# Patient Record
Sex: MALE | Race: WHITE | NOT HISPANIC OR LATINO | Employment: FULL TIME | URBAN - METROPOLITAN AREA
[De-identification: names, ages, dates, MRNs, and addresses within clinical notes are randomized per-mention and may not be internally consistent; named-entity substitution may affect disease eponyms.]

---

## 2018-02-28 ENCOUNTER — OFFICE VISIT (OUTPATIENT)
Dept: FAMILY MEDICINE CLINIC | Facility: CLINIC | Age: 58
End: 2018-02-28
Payer: COMMERCIAL

## 2018-02-28 VITALS
OXYGEN SATURATION: 98 % | HEART RATE: 82 BPM | DIASTOLIC BLOOD PRESSURE: 88 MMHG | TEMPERATURE: 97.2 F | WEIGHT: 189 LBS | HEIGHT: 69 IN | RESPIRATION RATE: 16 BRPM | BODY MASS INDEX: 27.99 KG/M2 | SYSTOLIC BLOOD PRESSURE: 130 MMHG

## 2018-02-28 DIAGNOSIS — Z12.11 SCREEN FOR COLON CANCER: ICD-10-CM

## 2018-02-28 DIAGNOSIS — R19.7 DIARRHEA, UNSPECIFIED TYPE: ICD-10-CM

## 2018-02-28 DIAGNOSIS — Z12.5 SCREENING FOR PROSTATE CANCER: Primary | ICD-10-CM

## 2018-02-28 DIAGNOSIS — R10.9 ABDOMINAL PAIN, UNSPECIFIED ABDOMINAL LOCATION: ICD-10-CM

## 2018-02-28 PROCEDURE — 99203 OFFICE O/P NEW LOW 30 MIN: CPT | Performed by: INTERNAL MEDICINE

## 2018-02-28 RX ORDER — DIPHENOXYLATE HYDROCHLORIDE AND ATROPINE SULFATE 2.5; .025 MG/1; MG/1
1 TABLET ORAL DAILY
COMMUNITY

## 2018-02-28 NOTE — PROGRESS NOTES
Assessment/Plan:         Diagnoses and all orders for this visit:    Screening for prostate cancer  -     PSA, total and free; Future    Screen for colon cancer  -     Ambulatory referral to Colorectal Surgery; Future    Diarrhea, unspecified type  Comments:  he will collect the stool studies if diarrhea persits beyond 2 days  Orders:  -     CBC; Future  -     Comprehensive metabolic panel; Future  -     Lipid panel; Future  -     Cancel: TSH+Free T4  -     Sedimentation rate, automated; Future  -     Stool Enteric Bacterial Panel by PCR; Future  -     FECAL LEUKOCYTES; Future  -     Ova and parasite examination; Future  -     C difficile Toxins A+B, EIA; Future    Abdominal pain, unspecified abdominal location  -     Amylase; Future    Other orders  -     multivitamin (THERAGRAN) TABS; Take 1 tablet by mouth daily          Subjective:      Patient ID: Jacinta Livingston is a 65 y o  male  Pt has pain around his bladder  +diarrhea x 2 days  He does not have today  +fatigued  Denies f/s/c  Denies brb, melena, +some nausea - vomit   +constant pain  +pain worse with twisting or pushing on it   +diarrhea x 2 a day, ?tenesmus  Diarrhea - travel + well water, denies antibiotic within the month   +around grandkids with diarrhea  Reviewed hx/soc  hx        The following portions of the patient's history were reviewed and updated as appropriate: He  has no past medical history on file  He There are no active problems to display for this patient  He  has a past surgical history that includes Hernia repair  His family history includes Cancer in his mother; Hyperlipidemia in his father; Hypertension in his father  He  reports that he has been smoking  He has a 30 00 pack-year smoking history  He has never used smokeless tobacco  He reports that he does not drink alcohol or use drugs    Current Outpatient Prescriptions   Medication Sig Dispense Refill    multivitamin (THERAGRAN) TABS Take 1 tablet by mouth daily No current facility-administered medications for this visit  No current outpatient prescriptions on file prior to visit  No current facility-administered medications on file prior to visit  He has No Known Allergies       Review of Systems   Constitutional: Negative for chills and fever  HENT: Negative  Respiratory: Negative  Cardiovascular: Negative  Gastrointestinal: Positive for abdominal pain, diarrhea and nausea  Negative for anal bleeding, blood in stool, constipation and vomiting  Objective:      /88 (BP Location: Left arm, Patient Position: Sitting, Cuff Size: Large)   Pulse 82   Temp (!) 97 2 °F (36 2 °C) (Tympanic)   Resp 16   Ht 5' 9" (1 753 m)   Wt 85 7 kg (189 lb)   SpO2 98%   BMI 27 91 kg/m²          Physical Exam   Constitutional: He appears well-developed and well-nourished  HENT:   Head: Normocephalic and atraumatic  Neck: Normal range of motion  Neck supple  Cardiovascular: Normal rate, regular rhythm and normal heart sounds  Pulmonary/Chest: Effort normal and breath sounds normal    Abdominal: Soft   Bowel sounds are normal    Genitourinary: Rectum normal and prostate normal

## 2018-03-03 ENCOUNTER — APPOINTMENT (OUTPATIENT)
Dept: LAB | Facility: HOSPITAL | Age: 58
End: 2018-03-03
Payer: COMMERCIAL

## 2018-03-03 DIAGNOSIS — Z12.5 SCREENING FOR PROSTATE CANCER: ICD-10-CM

## 2018-03-03 DIAGNOSIS — R10.9 ABDOMINAL PAIN, UNSPECIFIED ABDOMINAL LOCATION: ICD-10-CM

## 2018-03-03 DIAGNOSIS — R19.7 DIARRHEA, UNSPECIFIED TYPE: Primary | ICD-10-CM

## 2018-03-03 LAB
ALBUMIN SERPL BCP-MCNC: 3.9 G/DL (ref 3.5–5)
ALP SERPL-CCNC: 74 U/L (ref 46–116)
ALT SERPL W P-5'-P-CCNC: 29 U/L (ref 12–78)
AMYLASE SERPL-CCNC: 54 IU/L (ref 25–115)
ANION GAP SERPL CALCULATED.3IONS-SCNC: 4 MMOL/L (ref 4–13)
AST SERPL W P-5'-P-CCNC: 13 U/L (ref 5–45)
BILIRUB SERPL-MCNC: 0.5 MG/DL (ref 0.2–1)
BUN SERPL-MCNC: 21 MG/DL (ref 5–25)
CALCIUM SERPL-MCNC: 10 MG/DL (ref 8.3–10.1)
CHLORIDE SERPL-SCNC: 107 MMOL/L (ref 100–108)
CHOLEST SERPL-MCNC: 243 MG/DL (ref 50–200)
CO2 SERPL-SCNC: 32 MMOL/L (ref 21–32)
CREAT SERPL-MCNC: 1.19 MG/DL (ref 0.6–1.3)
ERYTHROCYTE [DISTWIDTH] IN BLOOD BY AUTOMATED COUNT: 12.8 % (ref 11.6–15.1)
ERYTHROCYTE [SEDIMENTATION RATE] IN BLOOD: 12 MM/HOUR (ref 0–10)
GFR SERPL CREATININE-BSD FRML MDRD: 68 ML/MIN/1.73SQ M
GLUCOSE P FAST SERPL-MCNC: 105 MG/DL (ref 65–99)
HCT VFR BLD AUTO: 51.4 % (ref 36.5–49.3)
HDLC SERPL-MCNC: 45 MG/DL (ref 40–60)
HGB BLD-MCNC: 17.4 G/DL (ref 12–17)
LDLC SERPL CALC-MCNC: 179 MG/DL (ref 0–100)
MCH RBC QN AUTO: 29.6 PG (ref 26.8–34.3)
MCHC RBC AUTO-ENTMCNC: 33.9 G/DL (ref 31.4–37.4)
MCV RBC AUTO: 87 FL (ref 82–98)
PLATELET # BLD AUTO: 215 THOUSANDS/UL (ref 149–390)
PMV BLD AUTO: 12.3 FL (ref 8.9–12.7)
POTASSIUM SERPL-SCNC: 4.8 MMOL/L (ref 3.5–5.3)
PROT SERPL-MCNC: 8.1 G/DL (ref 6.4–8.2)
RBC # BLD AUTO: 5.88 MILLION/UL (ref 3.88–5.62)
SODIUM SERPL-SCNC: 143 MMOL/L (ref 136–145)
T4 FREE SERPL-MCNC: 1.08 NG/DL (ref 0.76–1.46)
TRIGL SERPL-MCNC: 93 MG/DL
TSH SERPL DL<=0.05 MIU/L-ACNC: 1.39 UIU/ML (ref 0.36–3.74)
WBC # BLD AUTO: 6.44 THOUSAND/UL (ref 4.31–10.16)

## 2018-03-03 PROCEDURE — 85652 RBC SED RATE AUTOMATED: CPT

## 2018-03-03 PROCEDURE — 84439 ASSAY OF FREE THYROXINE: CPT

## 2018-03-03 PROCEDURE — 84154 ASSAY OF PSA FREE: CPT

## 2018-03-03 PROCEDURE — 84153 ASSAY OF PSA TOTAL: CPT

## 2018-03-03 PROCEDURE — 80061 LIPID PANEL: CPT

## 2018-03-03 PROCEDURE — 80053 COMPREHEN METABOLIC PANEL: CPT

## 2018-03-03 PROCEDURE — 82150 ASSAY OF AMYLASE: CPT

## 2018-03-03 PROCEDURE — 85027 COMPLETE CBC AUTOMATED: CPT

## 2018-03-03 PROCEDURE — 36415 COLL VENOUS BLD VENIPUNCTURE: CPT

## 2018-03-03 PROCEDURE — 84443 ASSAY THYROID STIM HORMONE: CPT

## 2018-03-05 ENCOUNTER — TELEPHONE (OUTPATIENT)
Dept: OTHER | Facility: HOSPITAL | Age: 58
End: 2018-03-05

## 2018-03-05 NOTE — TELEPHONE ENCOUNTER
Told pt lab okay except elevated cholesterol  Can try diet/red yeast rice or start statin  To let me know what he wants  Also await his psa

## 2018-03-06 ENCOUNTER — APPOINTMENT (OUTPATIENT)
Dept: LAB | Facility: HOSPITAL | Age: 58
End: 2018-03-06
Payer: COMMERCIAL

## 2018-03-06 DIAGNOSIS — R19.7 DIARRHEA, UNSPECIFIED TYPE: ICD-10-CM

## 2018-03-06 LAB
PSA FREE MFR SERPL: 22.7 %
PSA FREE SERPL-MCNC: 0.75 NG/ML
PSA SERPL-MCNC: 3.3 NG/ML (ref 0–4)

## 2018-03-06 PROCEDURE — 89055 LEUKOCYTE ASSESSMENT FECAL: CPT

## 2018-03-06 PROCEDURE — 87505 NFCT AGENT DETECTION GI: CPT

## 2018-03-06 PROCEDURE — 87493 C DIFF AMPLIFIED PROBE: CPT

## 2018-03-06 PROCEDURE — 87177 OVA AND PARASITES SMEARS: CPT

## 2018-03-06 PROCEDURE — 87209 SMEAR COMPLEX STAIN: CPT

## 2018-03-07 LAB
C DIFF TOX GENS STL QL NAA+PROBE: ABNORMAL
CAMPYLOBACTER DNA SPEC NAA+PROBE: NORMAL
SALMONELLA DNA SPEC QL NAA+PROBE: NORMAL
SHIGA TOXIN STX GENE SPEC NAA+PROBE: NORMAL
SHIGELLA DNA SPEC QL NAA+PROBE: NORMAL

## 2018-03-08 ENCOUNTER — TELEPHONE (OUTPATIENT)
Dept: OTHER | Facility: HOSPITAL | Age: 58
End: 2018-03-08

## 2018-03-08 DIAGNOSIS — R97.20 ELEVATED PSA: Primary | ICD-10-CM

## 2018-03-09 LAB
O+P STL CONC: NORMAL
WBC SPEC QL GRAM STN: NORMAL

## 2018-04-11 ENCOUNTER — OFFICE VISIT (OUTPATIENT)
Dept: FAMILY MEDICINE CLINIC | Facility: CLINIC | Age: 58
End: 2018-04-11
Payer: COMMERCIAL

## 2018-04-11 VITALS
TEMPERATURE: 97.5 F | WEIGHT: 188 LBS | OXYGEN SATURATION: 98 % | HEIGHT: 69 IN | RESPIRATION RATE: 16 BRPM | HEART RATE: 85 BPM | DIASTOLIC BLOOD PRESSURE: 80 MMHG | BODY MASS INDEX: 27.85 KG/M2 | SYSTOLIC BLOOD PRESSURE: 130 MMHG

## 2018-04-11 DIAGNOSIS — E78.5 HYPERLIPIDEMIA, UNSPECIFIED HYPERLIPIDEMIA TYPE: Primary | ICD-10-CM

## 2018-04-11 DIAGNOSIS — L72.3 SEBACEOUS CYST: ICD-10-CM

## 2018-04-11 PROCEDURE — 99214 OFFICE O/P EST MOD 30 MIN: CPT | Performed by: PHYSICIAN ASSISTANT

## 2018-04-11 NOTE — PROGRESS NOTES
Assessment/Plan:         Diagnoses and all orders for this visit:    Hyperlipidemia, unspecified hyperlipidemia type  Comments:    Discussed low-fat diet exercise and weight loss  Recheck lipids in 6 months    Sebaceous cyst  Comments:   sebaceous cysts right arm observed  Subjective:      Patient ID: Bozena Weston is a 63 y o  male  Patient presents for follow-up  Patient was here with some abdominal pain he had lots of labs and stool cultures done labs and cultures reviewed  Patient had a positive C diff  He was treated with antibiotics orally  Bowels have since returned to normal   Patient is eating yogurt  Labs reviewed show total cholesterol 243 and an LDL of 179  Fasting blood sugar was 105  Patient also states his blood pressure was a little elevated at last visit and he was told to come in and have his blood pressure checked  Patient would also like to have lump in his right axilla checked  Patient states he founded a day or so ago is not painful and no change in size and he  just found it  The following portions of the patient's history were reviewed and updated as appropriate:   He  has no past medical history on file  He   Patient Active Problem List    Diagnosis Date Noted    Hyperlipidemia 04/11/2018    Sebaceous cyst 04/11/2018     His family history includes Cancer in his mother; Hyperlipidemia in his father; Hypertension in his father  Current Outpatient Prescriptions   Medication Sig Dispense Refill    multivitamin (THERAGRAN) TABS Take 1 tablet by mouth daily       No current facility-administered medications for this visit  Current Outpatient Prescriptions on File Prior to Visit   Medication Sig    multivitamin (THERAGRAN) TABS Take 1 tablet by mouth daily     No current facility-administered medications on file prior to visit  He has No Known Allergies       Review of Systems   Respiratory: Negative for cough and shortness of breath      Cardiovascular: Negative for chest pain and leg swelling  Gastrointestinal: Negative for abdominal pain  Musculoskeletal: Negative for arthralgias  Skin: Negative for rash  Neurological: Negative for dizziness and headaches  Objective:      /80   Pulse 85   Temp 97 5 °F (36 4 °C) (Tympanic)   Resp 16   Ht 5' 9" (1 753 m)   Wt 85 3 kg (188 lb)   SpO2 98%   BMI 27 76 kg/m²          Physical Exam   Constitutional: He is oriented to person, place, and time  He appears well-developed and well-nourished  HENT:   Head: Normocephalic  Right Ear: External ear normal    Left Ear: External ear normal    Mouth/Throat: Oropharynx is clear and moist    Eyes: Conjunctivae are normal  Pupils are equal, round, and reactive to light  Neck: No thyromegaly present  Cardiovascular: Normal rate and regular rhythm  No murmur heard  Pulmonary/Chest: Effort normal and breath sounds normal    Musculoskeletal: He exhibits no edema  Lymphadenopathy:     He has no cervical adenopathy  Neurological: He is alert and oriented to person, place, and time  Skin: Skin is warm and dry  Patient has no clavicular cervical or axillary nodes  Patient has a small sebaceous cyst right upper inner arm  Observe   Psychiatric: He has a normal mood and affect

## 2018-09-26 ENCOUNTER — OFFICE VISIT (OUTPATIENT)
Dept: FAMILY MEDICINE CLINIC | Facility: CLINIC | Age: 58
End: 2018-09-26
Payer: COMMERCIAL

## 2018-09-26 VITALS
HEIGHT: 69 IN | BODY MASS INDEX: 28.11 KG/M2 | RESPIRATION RATE: 16 BRPM | OXYGEN SATURATION: 96 % | HEART RATE: 72 BPM | TEMPERATURE: 97.9 F | DIASTOLIC BLOOD PRESSURE: 76 MMHG | WEIGHT: 189.8 LBS | SYSTOLIC BLOOD PRESSURE: 118 MMHG

## 2018-09-26 DIAGNOSIS — J06.9 UPPER RESPIRATORY TRACT INFECTION, UNSPECIFIED TYPE: Primary | ICD-10-CM

## 2018-09-26 PROCEDURE — 99213 OFFICE O/P EST LOW 20 MIN: CPT | Performed by: INTERNAL MEDICINE

## 2018-09-26 PROCEDURE — 3008F BODY MASS INDEX DOCD: CPT | Performed by: INTERNAL MEDICINE

## 2018-09-26 RX ORDER — AMOXICILLIN 500 MG/1
500 CAPSULE ORAL EVERY 8 HOURS SCHEDULED
Qty: 30 CAPSULE | Refills: 0 | Status: SHIPPED | OUTPATIENT
Start: 2018-09-26 | End: 2018-10-06

## 2018-09-26 NOTE — PROGRESS NOTES
Assessment/Plan:         Diagnoses and all orders for this visit:    Upper respiratory tract infection, unspecified type  Comments:  rx amox    Orders:  -     amoxicillin (AMOXIL) 500 mg capsule; Take 1 capsule (500 mg total) by mouth every 8 (eight) hours for 10 days          Subjective:      Patient ID: Kerrie Howell is a 63 y o  male     +cough, chest pain with cough  Ill x 1 week   +chills +post nasal drip +sore throat +head congest +gurgle sound with deep breath        The following portions of the patient's history were reviewed and updated as appropriate:   He  has no past medical history on file  He   Patient Active Problem List    Diagnosis Date Noted    Hyperlipidemia 04/11/2018    Sebaceous cyst 04/11/2018     He  has a past surgical history that includes Hernia repair  His family history includes Cancer in his mother; Hyperlipidemia in his father; Hypertension in his father  He  reports that he has been smoking  He has a 30 00 pack-year smoking history  He has never used smokeless tobacco  He reports that he drinks alcohol  He reports that he does not use drugs  Current Outpatient Prescriptions   Medication Sig Dispense Refill    multivitamin (THERAGRAN) TABS Take 1 tablet by mouth daily      amoxicillin (AMOXIL) 500 mg capsule Take 1 capsule (500 mg total) by mouth every 8 (eight) hours for 10 days 30 capsule 0     No current facility-administered medications for this visit  Current Outpatient Prescriptions on File Prior to Visit   Medication Sig    multivitamin (THERAGRAN) TABS Take 1 tablet by mouth daily     No current facility-administered medications on file prior to visit  He has No Known Allergies       Review of Systems   Constitutional: Negative  HENT: Positive for congestion, postnasal drip and sore throat  Negative for voice change  Respiratory: Positive for cough  Cardiovascular: Positive for chest pain          Chest pain with cough         Objective:      BP 118/76 (BP Location: Right arm, Patient Position: Sitting, Cuff Size: Standard)   Pulse 72   Temp 97 9 °F (36 6 °C)   Resp 16   Ht 5' 9" (1 753 m)   Wt 86 1 kg (189 lb 12 8 oz)   SpO2 96%   BMI 28 03 kg/m²          Physical Exam   Constitutional: He appears well-developed and well-nourished  HENT:   Head: Normocephalic and atraumatic     Right Ear: External ear normal    Left Ear: External ear normal    Nose: Nose normal    Mouth/Throat: Oropharynx is clear and moist

## 2018-12-03 ENCOUNTER — OFFICE VISIT (OUTPATIENT)
Dept: FAMILY MEDICINE CLINIC | Facility: CLINIC | Age: 58
End: 2018-12-03
Payer: COMMERCIAL

## 2018-12-03 VITALS
OXYGEN SATURATION: 96 % | HEIGHT: 69 IN | BODY MASS INDEX: 28.44 KG/M2 | WEIGHT: 192 LBS | DIASTOLIC BLOOD PRESSURE: 74 MMHG | RESPIRATION RATE: 16 BRPM | TEMPERATURE: 97.9 F | SYSTOLIC BLOOD PRESSURE: 130 MMHG | HEART RATE: 88 BPM

## 2018-12-03 DIAGNOSIS — J32.9 SINUSITIS, UNSPECIFIED CHRONICITY, UNSPECIFIED LOCATION: ICD-10-CM

## 2018-12-03 DIAGNOSIS — Z12.11 SCREEN FOR COLON CANCER: Primary | ICD-10-CM

## 2018-12-03 DIAGNOSIS — J40 BRONCHITIS: ICD-10-CM

## 2018-12-03 PROCEDURE — 99213 OFFICE O/P EST LOW 20 MIN: CPT | Performed by: INTERNAL MEDICINE

## 2018-12-03 PROCEDURE — 3008F BODY MASS INDEX DOCD: CPT | Performed by: INTERNAL MEDICINE

## 2018-12-03 RX ORDER — LEVOFLOXACIN 500 MG/1
500 TABLET, FILM COATED ORAL EVERY 24 HOURS
Qty: 10 TABLET | Refills: 0 | Status: SHIPPED | OUTPATIENT
Start: 2018-12-03 | End: 2018-12-13

## 2018-12-03 NOTE — PROGRESS NOTES
Assessment/Plan:         Diagnoses and all orders for this visit:    Screen for colon cancer  -     Occult Blood, Fecal Immunochemical; Future    Sinusitis, unspecified chronicity, unspecified location  -     levofloxacin (LEVAQUIN) 500 mg tablet; Take 1 tablet (500 mg total) by mouth every 24 hours for 10 days    Bronchitis  -     levofloxacin (LEVAQUIN) 500 mg tablet; Take 1 tablet (500 mg total) by mouth every 24 hours for 10 days          Subjective:      Patient ID: Petra Little is a 63 y o  male  +head stuffy   +around sick grandchildren  +sore throat denies f/s/c   +prod cough  Ill since thursday        The following portions of the patient's history were reviewed and updated as appropriate:   He  has no past medical history on file  He   Patient Active Problem List    Diagnosis Date Noted    Hyperlipidemia 04/11/2018    Sebaceous cyst 04/11/2018     He  has a past surgical history that includes Hernia repair  His family history includes Cancer in his mother; Hyperlipidemia in his father; Hypertension in his father  He  reports that he has been smoking  He has a 30 00 pack-year smoking history  He has never used smokeless tobacco  He reports that he drinks alcohol  He reports that he does not use drugs  Current Outpatient Prescriptions   Medication Sig Dispense Refill    multivitamin (THERAGRAN) TABS Take 1 tablet by mouth daily      levofloxacin (LEVAQUIN) 500 mg tablet Take 1 tablet (500 mg total) by mouth every 24 hours for 10 days 10 tablet 0     No current facility-administered medications for this visit  Current Outpatient Prescriptions on File Prior to Visit   Medication Sig    multivitamin (THERAGRAN) TABS Take 1 tablet by mouth daily     No current facility-administered medications on file prior to visit  He has No Known Allergies       Review of Systems   Constitutional: Negative for fever     HENT: Positive for congestion, postnasal drip, sinus pressure, sore throat and voice change  Respiratory: Positive for cough  Cardiovascular: Negative  Neurological: Negative for headaches  Objective:      /74 (BP Location: Right arm, Patient Position: Sitting, Cuff Size: Large)   Pulse 88   Temp 97 9 °F (36 6 °C) (Tympanic)   Resp 16   Ht 5' 9" (1 753 m)   Wt 87 1 kg (192 lb)   SpO2 96%   BMI 28 35 kg/m²          Physical Exam   Constitutional: He appears well-developed and well-nourished  HENT:   Head: Normocephalic and atraumatic  Right Ear: External ear normal    Left Ear: External ear normal    Nose: Nose normal    Mouth/Throat: Oropharynx is clear and moist    Neck: Normal range of motion  Neck supple  Cardiovascular: Normal rate, regular rhythm and normal heart sounds      Pulmonary/Chest: Effort normal and breath sounds normal

## 2019-02-19 ENCOUNTER — OFFICE VISIT (OUTPATIENT)
Dept: FAMILY MEDICINE CLINIC | Facility: CLINIC | Age: 59
End: 2019-02-19
Payer: COMMERCIAL

## 2019-02-19 VITALS
BODY MASS INDEX: 28.44 KG/M2 | WEIGHT: 192 LBS | TEMPERATURE: 97.4 F | HEIGHT: 69 IN | DIASTOLIC BLOOD PRESSURE: 82 MMHG | RESPIRATION RATE: 16 BRPM | SYSTOLIC BLOOD PRESSURE: 106 MMHG | OXYGEN SATURATION: 98 % | HEART RATE: 69 BPM

## 2019-02-19 DIAGNOSIS — K52.9 GASTROENTERITIS: Primary | ICD-10-CM

## 2019-02-19 PROCEDURE — 99213 OFFICE O/P EST LOW 20 MIN: CPT | Performed by: INTERNAL MEDICINE

## 2019-02-19 PROCEDURE — 3008F BODY MASS INDEX DOCD: CPT | Performed by: INTERNAL MEDICINE

## 2019-02-19 RX ORDER — LEVOFLOXACIN 500 MG/1
500 TABLET, FILM COATED ORAL EVERY 24 HOURS
Qty: 10 TABLET | Refills: 0 | Status: SHIPPED | OUTPATIENT
Start: 2019-02-19 | End: 2019-03-01

## 2019-02-19 NOTE — PROGRESS NOTES
Assessment/Plan:         Diagnoses and all orders for this visit:    Gastroenteritis  -     levofloxacin (LEVAQUIN) 500 mg tablet; Take 1 tablet (500 mg total) by mouth every 24 hours for 10 days          Subjective:      Patient ID: Yoel Ramirez is a 63 y o  male  Patient complains of feeling ill since Sunday  He states he had 3 episodes of diarrhea yesterday and has it again today  Denies fever sweats and chills  Denies nausea vomiting denies travel positive [place] in well [place] use  Denies antibiotic is within a month    positive for a PET CAT denies problem with a CT denies sore throat positive stuffy head denies cough      The following portions of the patient's history were reviewed and updated as appropriate: He  has no past medical history on file  He   Patient Active Problem List    Diagnosis Date Noted    Hyperlipidemia 04/11/2018    Sebaceous cyst 04/11/2018     He  has a past surgical history that includes Hernia repair  His family history includes Cancer in his mother; Hyperlipidemia in his father; Hypertension in his father  He  reports that he has been smoking  He has a 30 00 pack-year smoking history  He has never used smokeless tobacco  He reports that he drinks alcohol  He reports that he does not use drugs  Current Outpatient Medications   Medication Sig Dispense Refill    multivitamin (THERAGRAN) TABS Take 1 tablet by mouth daily      levofloxacin (LEVAQUIN) 500 mg tablet Take 1 tablet (500 mg total) by mouth every 24 hours for 10 days 10 tablet 0     No current facility-administered medications for this visit  Current Outpatient Medications on File Prior to Visit   Medication Sig    multivitamin (THERAGRAN) TABS Take 1 tablet by mouth daily     No current facility-administered medications on file prior to visit  He has No Known Allergies       Review of Systems   Constitutional: Negative for fever  HENT: Positive for congestion  Negative for voice change  Respiratory: Negative for cough  Cardiovascular: Negative  Gastrointestinal: Positive for abdominal distention and diarrhea  Negative for blood in stool, constipation, nausea and vomiting  Objective:      /82 (BP Location: Right arm, Patient Position: Sitting, Cuff Size: Large)   Pulse 69   Temp (!) 97 4 °F (36 3 °C) (Tympanic)   Resp 16   Ht 5' 9" (1 753 m)   Wt 87 1 kg (192 lb)   SpO2 98%   BMI 28 35 kg/m²          Physical Exam   Constitutional: He appears well-developed and well-nourished  Non-toxic appearance  He does not appear ill  No distress  HENT:   Mouth/Throat: Oropharynx is clear and moist    Cardiovascular: Normal rate, regular rhythm, normal heart sounds and intact distal pulses  Exam reveals no gallop  No murmur heard  Pulmonary/Chest: Effort normal and breath sounds normal  No respiratory distress  He has no wheezes  He exhibits no tenderness  Abdominal: Soft  Normal appearance and normal aorta  He exhibits no pulsatile liver, no abdominal bruit, no ascites and no pulsatile midline mass  Bowel sounds are decreased

## 2019-02-19 NOTE — PATIENT INSTRUCTIONS
Clear liquids then brat diet(banana, rice apple souce and toast) then soft  Next put roughage  No dairy x 2 weeks    Will rx levaquin

## 2020-01-21 ENCOUNTER — OFFICE VISIT (OUTPATIENT)
Dept: FAMILY MEDICINE CLINIC | Facility: CLINIC | Age: 60
End: 2020-01-21
Payer: COMMERCIAL

## 2020-01-21 VITALS
HEIGHT: 69 IN | DIASTOLIC BLOOD PRESSURE: 86 MMHG | OXYGEN SATURATION: 98 % | SYSTOLIC BLOOD PRESSURE: 120 MMHG | RESPIRATION RATE: 16 BRPM | TEMPERATURE: 97.6 F | HEART RATE: 77 BPM | WEIGHT: 193 LBS | BODY MASS INDEX: 28.58 KG/M2

## 2020-01-21 DIAGNOSIS — J02.9 PHARYNGITIS, UNSPECIFIED ETIOLOGY: ICD-10-CM

## 2020-01-21 DIAGNOSIS — Z12.5 SCREENING FOR PROSTATE CANCER: ICD-10-CM

## 2020-01-21 DIAGNOSIS — E78.5 HYPERLIPIDEMIA, UNSPECIFIED HYPERLIPIDEMIA TYPE: Primary | ICD-10-CM

## 2020-01-21 LAB — S PYO AG THROAT QL: NEGATIVE

## 2020-01-21 PROCEDURE — 99214 OFFICE O/P EST MOD 30 MIN: CPT | Performed by: PHYSICIAN ASSISTANT

## 2020-01-21 PROCEDURE — 87070 CULTURE OTHR SPECIMN AEROBIC: CPT | Performed by: PHYSICIAN ASSISTANT

## 2020-01-21 PROCEDURE — 87880 STREP A ASSAY W/OPTIC: CPT | Performed by: PHYSICIAN ASSISTANT

## 2020-01-21 PROCEDURE — 3008F BODY MASS INDEX DOCD: CPT | Performed by: PHYSICIAN ASSISTANT

## 2020-01-21 RX ORDER — AMOXICILLIN 500 MG/1
500 CAPSULE ORAL EVERY 8 HOURS SCHEDULED
Qty: 30 CAPSULE | Refills: 0 | Status: SHIPPED | OUTPATIENT
Start: 2020-01-21 | End: 2020-01-31

## 2020-01-21 NOTE — PROGRESS NOTES
BMI Counseling: Body mass index is 28 5 kg/m²  The BMI is above normal  Nutrition recommendations include decreasing portion sizes, decreasing fast food intake, consuming healthier snacks, limiting drinks that contain sugar and moderation in carbohydrate intake  Exercise recommendations include exercising 3-5 times per week  No pharmacotherapy was ordered  Assessment/Plan:     Diagnoses and all orders for this visit:    Hyperlipidemia, unspecified hyperlipidemia type  Comments:  Labs ordered  Orders:  -     Comprehensive metabolic panel  -     Lipid panel    Pharyngitis, unspecified etiology  Comments:  Rapid strep a in office is negative  P o  Amoxicillin ordered and throat culture sent to lab  Orders:  -     POCT rapid strepA  -     Throat culture; Future  -     amoxicillin (AMOXIL) 500 mg capsule; Take 1 capsule (500 mg total) by mouth every 8 (eight) hours for 10 days    Screening for prostate cancer  -     PSA, Total Screen          Subjective:      Patient ID: Keara Peck is a 63 y o  male  Presents with upper respiratory symptoms for the last 5-6 days  Patient states he started with sinus pressure and congestion  He now has chest congestion and a productive cough  He has ear pressure on and off scratchy throat on and off  No nausea vomiting diarrhea  Patient has no fever  No over-the-counter meds taken  Patient is due for labs for repeat cholesterol also due for PSA screening    Discussed colo guard for:  Rectal cancer screening      The following portions of the patient's history were reviewed and updated as appropriate:   He   Patient Active Problem List    Diagnosis Date Noted    Hyperlipidemia 04/11/2018    Sebaceous cyst 04/11/2018     Current Outpatient Medications   Medication Sig Dispense Refill    multivitamin (THERAGRAN) TABS Take 1 tablet by mouth daily      amoxicillin (AMOXIL) 500 mg capsule Take 1 capsule (500 mg total) by mouth every 8 (eight) hours for 10 days 30 capsule 0     No current facility-administered medications for this visit  Current Outpatient Medications on File Prior to Visit   Medication Sig    multivitamin (THERAGRAN) TABS Take 1 tablet by mouth daily     No current facility-administered medications on file prior to visit  He has No Known Allergies       Review of Systems   Constitutional: Negative for activity change, appetite change, chills, fatigue and fever  HENT: Positive for congestion, ear pain, postnasal drip, sinus pain and sore throat  Negative for rhinorrhea, sinus pressure and sneezing  Respiratory: Positive for cough  Objective:        Physical Exam   Constitutional: He is oriented to person, place, and time  He appears well-developed and well-nourished  No distress  HENT:   Head: Normocephalic and atraumatic  Right Ear: Tympanic membrane, external ear and ear canal normal    Left Ear: Tympanic membrane, external ear and ear canal normal    Nose: Nose normal  No rhinorrhea  Right sinus exhibits no maxillary sinus tenderness and no frontal sinus tenderness  Left sinus exhibits no maxillary sinus tenderness and no frontal sinus tenderness  Mouth/Throat: Posterior oropharyngeal erythema present  No oropharyngeal exudate  Eyes: Pupils are equal, round, and reactive to light  Conjunctivae are normal    Cardiovascular: Normal rate, regular rhythm and normal heart sounds  No murmur heard  Pulmonary/Chest: Effort normal and breath sounds normal  No respiratory distress  He has no wheezes  He has no rales  Musculoskeletal: He exhibits no edema  Lymphadenopathy:     He has cervical adenopathy  Neurological: He is alert and oriented to person, place, and time  Skin: Skin is warm and dry  He is not diaphoretic  Psychiatric: He has a normal mood and affect  His behavior is normal  Judgment and thought content normal    Nursing note and vitals reviewed

## 2020-01-24 LAB — BACTERIA THROAT CULT: NORMAL

## 2020-03-12 ENCOUNTER — OFFICE VISIT (OUTPATIENT)
Dept: FAMILY MEDICINE CLINIC | Facility: CLINIC | Age: 60
End: 2020-03-12
Payer: COMMERCIAL

## 2020-03-12 VITALS
OXYGEN SATURATION: 98 % | WEIGHT: 195.8 LBS | HEIGHT: 69 IN | HEART RATE: 76 BPM | TEMPERATURE: 97.9 F | DIASTOLIC BLOOD PRESSURE: 82 MMHG | SYSTOLIC BLOOD PRESSURE: 128 MMHG | BODY MASS INDEX: 29 KG/M2

## 2020-03-12 DIAGNOSIS — J06.9 URI WITH COUGH AND CONGESTION: Primary | ICD-10-CM

## 2020-03-12 PROCEDURE — 3008F BODY MASS INDEX DOCD: CPT | Performed by: FAMILY MEDICINE

## 2020-03-12 PROCEDURE — 99213 OFFICE O/P EST LOW 20 MIN: CPT | Performed by: FAMILY MEDICINE

## 2020-03-12 RX ORDER — AZITHROMYCIN 250 MG/1
TABLET, FILM COATED ORAL
Qty: 6 TABLET | Refills: 0 | Status: SHIPPED | OUTPATIENT
Start: 2020-03-12 | End: 2020-03-17

## 2020-03-12 NOTE — PATIENT INSTRUCTIONS
Over the counter flonase nasal spray, claritin, and either mucinex DM or delsym  If no improvement, fill antibiotic prescription

## 2020-03-12 NOTE — PROGRESS NOTES
Assessment/Plan:    1  URI with cough and congestion  Comments:  OTC mucinex DM, delsym, flonase nasal spray, claritin  if sx do not improve fill rx zpak  Orders:  -     azithromycin (Zithromax) 250 mg tablet; Take 2 tablets (500 mg total) by mouth daily for 1 day, THEN 1 tablet (250 mg total) daily for 4 days  Patient Instructions   Over the counter flonase nasal spray, claritin, and either mucinex DM or delsym  If no improvement, fill antibiotic prescription  Return if symptoms worsen or fail to improve  Subjective:      Patient ID: Tamar Mary is a 63 y o  male  Chief Complaint   Patient presents with    Cough    Sore Throat    Sinus Problem       Here for evaluation of sore throat and cough x3 weeks  Was seen in January for similar illness which he was treated with amoxicillin pending strep culture  Strep culture was negative at last visit although patient did complete his antibiotic regimen  He started to feel better from this illness, then his wife was sick with similar sx and treated with abx, and now patient is experiencing similar symptoms again  He has a sore throat and feels mucous dripping down his throat which he feels the need to cough frequently  Has not tried any medications OTC  No fever, body aches, chills, wheezing, SOB, or chest pain  The following portions of the patient's history were reviewed and updated as appropriate: allergies, current medications, past family history, past medical history, past social history, past surgical history and problem list     Review of Systems   Constitutional: Negative for chills, fatigue and fever  HENT: Positive for congestion, postnasal drip, sinus pressure, sinus pain and sore throat  Eyes: Negative for visual disturbance  Respiratory: Positive for cough  Negative for chest tightness and shortness of breath  Cardiovascular: Negative for chest pain and palpitations     Gastrointestinal: Negative for abdominal pain, diarrhea, nausea and vomiting  Genitourinary: Negative for difficulty urinating  Musculoskeletal: Negative for arthralgias and myalgias  Neurological: Negative for light-headedness and headaches  Hematological: Does not bruise/bleed easily  Current Outpatient Medications   Medication Sig Dispense Refill    azithromycin (Zithromax) 250 mg tablet Take 2 tablets (500 mg total) by mouth daily for 1 day, THEN 1 tablet (250 mg total) daily for 4 days  6 tablet 0    multivitamin (THERAGRAN) TABS Take 1 tablet by mouth daily       No current facility-administered medications for this visit  Objective:    /82 (BP Location: Right arm, Patient Position: Sitting, Cuff Size: Standard)   Pulse 76   Temp 97 9 °F (36 6 °C)   Ht 5' 9" (1 753 m)   Wt 88 8 kg (195 lb 12 8 oz)   SpO2 98%   BMI 28 91 kg/m²        Physical Exam   Constitutional: He is oriented to person, place, and time  He appears well-developed and well-nourished  HENT:   Right Ear: External ear and ear canal normal  Tympanic membrane is bulging  Left Ear: External ear and ear canal normal  Tympanic membrane is bulging  Nose: Right sinus exhibits no maxillary sinus tenderness and no frontal sinus tenderness  Left sinus exhibits no maxillary sinus tenderness and no frontal sinus tenderness  Mouth/Throat: Posterior oropharyngeal erythema present  No oropharyngeal exudate or posterior oropharyngeal edema  No tonsillar exudate  Cardiovascular: Normal rate, regular rhythm and normal heart sounds  Pulmonary/Chest: Effort normal and breath sounds normal  He has no wheezes  He has no rhonchi  Lymphadenopathy:     He has no cervical adenopathy  Neurological: He is alert and oriented to person, place, and time  Skin: Skin is warm  Psychiatric: He has a normal mood and affect  His behavior is normal  Judgment and thought content normal    Nursing note and vitals reviewed               Randolph Encarnacion MD

## 2021-01-07 ENCOUNTER — TELEMEDICINE (OUTPATIENT)
Dept: FAMILY MEDICINE CLINIC | Facility: CLINIC | Age: 61
End: 2021-01-07
Payer: COMMERCIAL

## 2021-01-07 DIAGNOSIS — Z20.822 EXPOSURE TO COVID-19 VIRUS: Primary | ICD-10-CM

## 2021-01-07 PROCEDURE — 3725F SCREEN DEPRESSION PERFORMED: CPT | Performed by: PHYSICIAN ASSISTANT

## 2021-01-07 PROCEDURE — 99211 OFF/OP EST MAY X REQ PHY/QHP: CPT | Performed by: PHYSICIAN ASSISTANT

## 2021-01-07 NOTE — PROGRESS NOTES
COVID-19 Virtual Visit     Assessment/Plan:    Problem List Items Addressed This Visit     None      Visit Diagnoses     Exposure to COVID-19 virus    -  Primary    Relevant Orders    Novel Coronavirus (COVID-19), PCR [facility] - Collected in Office         Disposition:     I recommended the patient to come to our office to perform PCR testing for COVID-19  I have spent 4 minutes directly with the patient  Encounter provider Francisco Quintanilla PA-C    Provider located at [address] [place] [id number] [phone]    Recent Visits  No visits were found meeting these conditions  Showing recent visits within past 7 days and meeting all other requirements     Today's Visits  Date Type Provider Dept   01/07/21 Telemedicine [facility]   Showing today's visits and meeting all other requirements     Future Appointments  No visits were found meeting these conditions  Showing future appointments within next 150 days and meeting all other requirements        Patient agrees to participate in a virtual check in via telephone or video visit instead of presenting to the office to address urgent/immediate medical needs  Patient is aware this is a billable service  After connecting through Telephone, the patient was identified by name and date of birth  Keara Peck was informed that this was a telemedicine visit and that the exam was being conducted confidentially over secure lines  My office door was closed  No one else was in the room  Keara Peck acknowledged consent and understanding of privacy and security of the telemedicine visit  I informed the patient that I have reviewed his record in Epic and presented the opportunity for him to ask any questions regarding the visit today  The patient agreed to participate      It was my intent to perform this visit via video technology but the patient was not able to do a video connection so the visit was completed via audio telephone only  Subjective:   Heather Fisher is a 62 y o  male who is concerned about COVID-19  Patient is currently asymptomatic  Patient denies fever, chills, fatigue, malaise, congestion, rhinorrhea, sore throat, anosmia, loss of taste, cough, shortness of breath, chest tightness, abdominal pain, nausea, vomiting, diarrhea, myalgias and headaches  Date of exposure: 1/5/2021    Exposure:   Contact with a person who is under investigation (PUI) for or who is positive for COVID-19 within the last 14 days?: Yes    Hospitalized recently for fever and/or lower respiratory symptoms?: No      Currently a healthcare worker that is involved in direct patient care?: No      Works in a special setting where the risk of COVID-19 transmission may be high? (this may include long-term care, correctional and senior care facilities; homeless shelters; assisted-living facilities and group homes ): No      Resident in a special setting where the risk of COVID-19 transmission may be high? (this may include long-term care, correctional and senior care facilities; homeless shelters; assisted-living facilities and group homes ): No      Patient's wife is being tested for [place] she was exposed to her brother and sister-in-law who are positive for [place]  For exposure was few days last week and as of late as yesterday  She has had symptoms for 2 days now  Has been exposed to all of them  Currently is asymptomatic  He will be home quarantine  No results found for: Meg Guidry, [address]  No past medical history on file  Past Surgical History:   Procedure Laterality Date    HERNIA REPAIR       Current Outpatient Medications   Medication Sig Dispense Refill    multivitamin (THERAGRAN) TABS Take 1 tablet by mouth daily       No current facility-administered medications for this visit        No Known Allergies    Review of Systems   Constitutional: Negative for chills, fatigue and fever  HENT: Negative for congestion, rhinorrhea and sore throat  Respiratory: Negative for cough, chest tightness and shortness of breath  Gastrointestinal: Negative for abdominal pain, diarrhea, nausea and vomiting  Musculoskeletal: Negative for myalgias  Neurological: Negative for headaches  Objective: There were no vitals filed for this visit  Physical Exam  VIRTUAL VISIT DISCLAIMER    Opal Dyer acknowledges that he has consented to an online visit or consultation  He understands that the online visit is based solely on information provided by him, and that, in the absence of a face-to-face physical evaluation by the physician, the diagnosis he receives is both limited and provisional in terms of accuracy and completeness  This is not intended to replace a full medical face-to-face evaluation by the physician  Opal Dyer understands and accepts these terms

## 2021-05-25 ENCOUNTER — VBI (OUTPATIENT)
Dept: ADMINISTRATIVE | Facility: OTHER | Age: 61
End: 2021-05-25

## 2021-06-02 ENCOUNTER — TELEPHONE (OUTPATIENT)
Dept: FAMILY MEDICINE CLINIC | Facility: CLINIC | Age: 61
End: 2021-06-02

## 2023-12-18 ENCOUNTER — OFFICE VISIT (OUTPATIENT)
Dept: FAMILY MEDICINE CLINIC | Facility: CLINIC | Age: 63
End: 2023-12-18
Payer: COMMERCIAL

## 2023-12-18 ENCOUNTER — NURSE TRIAGE (OUTPATIENT)
Age: 63
End: 2023-12-18

## 2023-12-18 VITALS
TEMPERATURE: 97.3 F | SYSTOLIC BLOOD PRESSURE: 124 MMHG | DIASTOLIC BLOOD PRESSURE: 74 MMHG | WEIGHT: 190 LBS | OXYGEN SATURATION: 97 % | HEIGHT: 69 IN | HEART RATE: 74 BPM | BODY MASS INDEX: 28.14 KG/M2

## 2023-12-18 DIAGNOSIS — L02.33 CARBUNCLE AND FURUNCLE OF BUTTOCK: Primary | ICD-10-CM

## 2023-12-18 DIAGNOSIS — J01.10 ACUTE NON-RECURRENT FRONTAL SINUSITIS: ICD-10-CM

## 2023-12-18 PROBLEM — J32.9 SINUSITIS: Status: ACTIVE | Noted: 2023-12-18

## 2023-12-18 PROCEDURE — 99214 OFFICE O/P EST MOD 30 MIN: CPT | Performed by: FAMILY MEDICINE

## 2023-12-18 RX ORDER — SULFAMETHOXAZOLE AND TRIMETHOPRIM 800; 160 MG/1; MG/1
1 TABLET ORAL EVERY 12 HOURS SCHEDULED
Qty: 20 TABLET | Refills: 0 | Status: SHIPPED | OUTPATIENT
Start: 2023-12-18 | End: 2023-12-28

## 2023-12-18 NOTE — TELEPHONE ENCOUNTER
"Patient's spouse Oksana called in tor report patient has 2 inch diameter area on right lower back that is blackened, redness, yellow-green pus or clear with bloody drainage; area is warm to touch. Patient was sick last week, laying on sofa,  recovering from possible flu with cough, sore throat, nasal congestion, runny nose, and one episode of epistaxis. Home Covid test was negative. Last visit telemedicine; 1/7/21; staff confirmed alright to book OV. OV scheduled with MD LEO for this morning at 10:30 AM. Confirmed insurance.     Reason for Disposition   Looks infected (fever, spreading redness, pus, or red streak)    Answer Assessment - Initial Assessment Questions  1. APPEARANCE of INJURY: \"What does the injury look like?\"       Blackened, redness, yellow-green with clear with blood pus drainage, and warm to the touch  2. SIZE: \"How large is the cut?\"       2 inch diameter; round shaped  3. BLEEDING: \"Is it bleeding now?\" If Yes, ask: \"Is it difficult to stop?\"       Blood in drainage  4. LOCATION: \"Where is the injury located?\"       Right buttock; between lower back and tailbone  5. ONSET: \"How long ago did the injury occur?\"       12/14-12/15/23; beginning of bedsore due to laying on couch for the past week recovering from flu with fever, nasal congestion, sore throat, cough, and one episode of bloody nose. Home Covid test was negative  6. MECHANISM: \"Tell me how it happened.\"       Laying around; not moving around due to illness  7. TETANUS: \"When was the last tetanus booster?\"      Unknown    Protocols used: Skin Injury-ADULT-[place]    "

## 2023-12-18 NOTE — PROGRESS NOTES
"Name: Audelia Bella      : 1960      MRN: [id number]  Encounter Provider: Vijay Looney MD  Encounter Date: 2023   Encounter department: [facility]    Assessment & Plan     1. Carbuncle and furuncle of buttock  -     sulfamethoxazole-trimethoprim (BACTRIM DS) 800-160 mg per tablet; Take 1 tablet by mouth every 12 (twelve) hours for 10 days    2. Acute non-recurrent frontal sinusitis  -     sulfamethoxazole-trimethoprim (BACTRIM DS) 800-160 mg per tablet; Take 1 tablet by mouth every 12 (twelve) hours for 10 days    Patient started some leftover amoxicillin for total of 4 days for sinusitis type symptoms, will asked patient to discontinue continue antibiotic and start Bactrim which will cover both sinusitis and skin infection  Try medication for the next 10 days, follow-up in 10 days to review progression of the furuncle  Keep the wound covered at this time    Depression Screening and Follow-up Plan: Patient was screened for depression during today's encounter. They screened negative with a PHQ-2 score of 0.        Subjective     HPI    63-year-old male patient presents for evaluation of a draining skin abscess.  Patient is accompanied by his wife today.  According patient, symptoms started on Friday.  Patient and his wife report a previous \"calcium deposit\" after further questioning this appears to be a sebaceous cyst that had occasional cheesy discharge.  Since Friday this has become progressively more painful, irritated and starting her pustular discharge.  No similar symptoms anywhere else in the body.    Of note, patient recently did use about antibiotics, amoxicillin for upper respiratory symptoms, has significant sinus pressure and URI symptoms which is slowly improving with antibiotic.    Review of Systems   Constitutional:  Negative for chills and fever (102 max).   HENT:  Positive for congestion, rhinorrhea and sore throat.    Respiratory:  Positive for cough. Negative " for chest tightness and shortness of breath.    Cardiovascular:  Negative for chest pain.   Gastrointestinal:  Negative for abdominal pain.   Musculoskeletal:  Positive for myalgias.   Skin:  Positive for wound.        Large furuncle noted on patient's right buttock, approximately 3 inches in diameter  Multiple draining pores   Neurological:  Negative for dizziness, light-headedness and headaches.       No past medical history on file.  Past Surgical History:   Procedure Laterality Date    HERNIA REPAIR       Family History   Problem Relation Age of Onset    Cancer Mother     Hypertension Father     Hyperlipidemia Father      Social History     Socioeconomic History    Marital status: /Civil Union     Spouse name: None    Number of children: None    Years of education: None    Highest education level: None   Occupational History    None   Tobacco Use    Smoking status: Every Day     Current packs/day: 1.00     Average packs/day: 1 pack/day for 70.7 years (70.7 ttl pk-yrs)     Types: Cigarettes     Start date: 4/1/1983    Smokeless tobacco: Never   Substance and Sexual Activity    Alcohol use: Not Currently     Comment: rarely    Drug use: No    Sexual activity: None   Other Topics Concern    None   Social History Narrative    None     Social Determinants of Health     Financial Resource Strain: Not on file   Food Insecurity: Not on file   Transportation Needs: Not on file   Physical Activity: Not on file   Stress: Not on file   Social Connections: Not on file   Intimate Partner Violence: Not on file   Housing Stability: Not on file     Current Outpatient Medications on File Prior to Visit   Medication Sig    multivitamin (THERAGRAN) TABS Take 1 tablet by mouth daily     No Known Allergies  Immunization History   Administered Date(s) Administered    Tdap 03/27/2016       Objective     /74 (BP Location: Left arm, Patient Position: Sitting, Cuff Size: Standard)   Pulse 74   Temp (!) 97.3 °F (36.3 °C)    "Ht 5' 9\" (1.753 m)   Wt 86.2 kg (190 lb)   SpO2 97%   BMI 28.06 kg/m²     Physical Exam  Constitutional:       General: He is not in acute distress.     Appearance: Normal appearance. He is not ill-appearing, toxic-appearing or diaphoretic.   HENT:      Head:      Comments: Tenderness over the bilateral frontal sinuses  Cardiovascular:      Rate and Rhythm: Normal rate and regular rhythm.      Pulses: Normal pulses.      Heart sounds: Normal heart sounds.   Pulmonary:      Effort: Pulmonary effort is normal. No respiratory distress.      Breath sounds: Normal breath sounds.   Abdominal:      General: Abdomen is flat.   Skin:     Comments: There is a large area of erythema and induration.  Skin on patient's right buttock approximately 3 inches in diameter  There is a 3 cm area of macerated skin with granulation tissue  Pressure applied to the affected area causes some pustular discharge   Neurological:      General: No focal deficit present.      Mental Status: He is alert.   Psychiatric:         Mood and Affect: Mood normal.       Vijay Looney MD    "

## 2023-12-28 ENCOUNTER — OFFICE VISIT (OUTPATIENT)
Dept: FAMILY MEDICINE CLINIC | Facility: CLINIC | Age: 63
End: 2023-12-28
Payer: COMMERCIAL

## 2023-12-28 VITALS
HEART RATE: 86 BPM | DIASTOLIC BLOOD PRESSURE: 76 MMHG | OXYGEN SATURATION: 97 % | HEIGHT: 69 IN | WEIGHT: 189 LBS | TEMPERATURE: 98.5 F | RESPIRATION RATE: 16 BRPM | BODY MASS INDEX: 27.99 KG/M2 | SYSTOLIC BLOOD PRESSURE: 130 MMHG

## 2023-12-28 DIAGNOSIS — L98.8 FISTULA: ICD-10-CM

## 2023-12-28 DIAGNOSIS — R50.9 FEVER, UNSPECIFIED FEVER CAUSE: ICD-10-CM

## 2023-12-28 DIAGNOSIS — R05.1 ACUTE COUGH: ICD-10-CM

## 2023-12-28 DIAGNOSIS — E78.5 HYPERLIPIDEMIA, UNSPECIFIED HYPERLIPIDEMIA TYPE: ICD-10-CM

## 2023-12-28 DIAGNOSIS — L02.33 CARBUNCLE AND FURUNCLE OF BUTTOCK: Primary | ICD-10-CM

## 2023-12-28 DIAGNOSIS — Z12.11 SCREEN FOR COLON CANCER: ICD-10-CM

## 2023-12-28 DIAGNOSIS — Z12.5 PROSTATE CANCER SCREENING: ICD-10-CM

## 2023-12-28 DIAGNOSIS — Z72.0 TOBACCO USE: ICD-10-CM

## 2023-12-28 PROCEDURE — 99214 OFFICE O/P EST MOD 30 MIN: CPT | Performed by: FAMILY MEDICINE

## 2023-12-28 RX ORDER — BENZONATATE 100 MG/1
100 CAPSULE ORAL 3 TIMES DAILY PRN
Qty: 45 CAPSULE | Refills: 0 | Status: SHIPPED | OUTPATIENT
Start: 2023-12-28

## 2023-12-28 NOTE — PROGRESS NOTES
Name: Audelia Bella      : 1960      MRN: [id number]  Encounter Provider: Vijay Looney MD  Encounter Date: 2023   Encounter department: [facility]    Assessment & Plan     1. Carbuncle and furuncle of buttock  -     Ambulatory Referral to Wound Care; Future    2. Screen for colon cancer  -     Ambulatory Referral to Gastroenterology; Future    3. Fistula  -     Ambulatory Referral to Wound Care; Future    4. Acute cough  -     benzonatate (TESSALON PERLES) 100 mg capsule; Take 1 capsule (100 mg total) by mouth 3 (three) times a day as needed for cough    5. Hyperlipidemia, unspecified hyperlipidemia type  -     Lipid panel; Future    6. Prostate cancer screening  -     PSA, Total Screen; Future    7. Tobacco use  -     Comprehensive metabolic panel; Future  -     CBC and differential; Future    8. Fever, unspecified fever cause  -     Comprehensive metabolic panel; Future  -     CBC and differential; Future      Although there is improvement into the furuncle/carbuncle of the buttock, there is now a sizable fistula that extends about 0.5 to 1 cm under the skin  Patient denies any significant pain at this time  Will refer to wound care for evaluation and advice regarding how to manage this relatively sizable wound    Patient is overdue for blood work, obtain CBC, CMP, PSA, lipid panel for evaluation  Last blood work completed in   Patient did have a fever approximately 3 weeks ago with persistent upper respiratory symptoms including congestion cough    Explained to patient that due to recent antibiotic use, a repeat course of antibiotic is not recommended  Steroids not recommended at this time as it can impact the wound healing process  Will treat based on symptoms, Tessalon Perle for cough suppression, Mucinex for congestion    If there are significant worsening symptoms please return for evaluation         Subjective     HPI    63-year-old male patient presents for follow-up  "regarding his most recent visit.  Patient was seen on 12/18/2023 for R forearm: Carbuncle of the buttock and was started on Bactrim for total of 10 days for treatment patient reports subjective improvement in symptoms however there is a \"hole\" in the affected area.  Patient reports pain has improved since last evaluation.    Patient continues to have episode of sinus pressure and persistent upper respiratory symptoms including cough.  Reports symptom has been ongoing for the last 3 weeks.  Despite taking 10 days of Bactrim patient reports only moderate improvement improvement.  Patient's wife report his fever was as high as 102 before coming to the doctor approximately 3 weeks ago.      Review of Systems   Constitutional:  Negative for chills and fever.   HENT:  Positive for congestion, postnasal drip and sore throat. Negative for rhinorrhea.    Respiratory:  Positive for cough.    Cardiovascular:  Negative for chest pain.   Gastrointestinal:  Negative for abdominal pain.   Skin:         Wound secondary to draining fistula from furuncle on the right buttock   Neurological:  Negative for dizziness, light-headedness and headaches.       History reviewed. No pertinent past medical history.  Past Surgical History:   Procedure Laterality Date    HERNIA REPAIR       Family History   Problem Relation Age of Onset    Cancer Mother     Hypertension Father     Hyperlipidemia Father      Social History     Socioeconomic History    Marital status: /Civil Union     Spouse name: None    Number of children: None    Years of education: None    Highest education level: None   Occupational History    None   Tobacco Use    Smoking status: Every Day     Current packs/day: 1.00     Average packs/day: 1 pack/day for 70.7 years (70.7 ttl pk-yrs)     Types: Cigarettes     Start date: 4/1/1983    Smokeless tobacco: Never   Vaping Use    Vaping status: Never Used   Substance and Sexual Activity    Alcohol use: Never    Drug use: No    " "Sexual activity: None   Other Topics Concern    None   Social History Narrative    None     Social Determinants of Health     Financial Resource Strain: Not on file   Food Insecurity: Not on file   Transportation Needs: Not on file   Physical Activity: Not on file   Stress: Not on file   Social Connections: Not on file   Intimate Partner Violence: Not on file   Housing Stability: Not on file     Current Outpatient Medications on File Prior to Visit   Medication Sig    multivitamin (THERAGRAN) TABS Take 1 tablet by mouth daily    sulfamethoxazole-trimethoprim (BACTRIM DS) 800-160 mg per tablet Take 1 tablet by mouth every 12 (twelve) hours for 10 days     No Known Allergies  Immunization History   Administered Date(s) Administered    Tdap 03/27/2016       Objective     /76 (BP Location: Left arm, Patient Position: Sitting, Cuff Size: Large)   Pulse 86   Temp 98.5 °F (36.9 °C) (Temporal)   Resp 16   Ht 5' 9\" (1.753 m)   Wt 85.7 kg (189 lb)   SpO2 97%   BMI 27.91 kg/m²     Physical Exam  Vitals reviewed.   Constitutional:       General: He is not in acute distress.     Appearance: Normal appearance. He is not ill-appearing, toxic-appearing or diaphoretic.   Cardiovascular:      Rate and Rhythm: Normal rate and regular rhythm.      Pulses: Normal pulses.      Heart sounds: Normal heart sounds. No murmur heard.  Pulmonary:      Effort: Pulmonary effort is normal.      Comments: cough  Abdominal:      General: Abdomen is flat.   Skin:     General: Skin is warm and dry.      Capillary Refill: Capillary refill takes less than 2 seconds.      Findings: Erythema present.      Comments: On exam, there is still area of induration with a nondraining fistula opening approximately 0.8 cm in diameter extending roughly 0.5-1 centimeter deep  There is still area of surrounding erythema, less extensive than previous evaluation   Neurological:      General: No focal deficit present.   Psychiatric:         Mood and Affect: " Mood normal.       Vijay Looney MD

## 2023-12-29 ENCOUNTER — APPOINTMENT (OUTPATIENT)
Dept: LAB | Facility: MEDICAL CENTER | Age: 63
End: 2023-12-29
Payer: COMMERCIAL

## 2023-12-29 DIAGNOSIS — Z12.5 PROSTATE CANCER SCREENING: ICD-10-CM

## 2023-12-29 DIAGNOSIS — E78.5 HYPERLIPIDEMIA, UNSPECIFIED HYPERLIPIDEMIA TYPE: ICD-10-CM

## 2023-12-29 DIAGNOSIS — Z72.0 TOBACCO USE: ICD-10-CM

## 2023-12-29 DIAGNOSIS — R50.9 FEVER, UNSPECIFIED FEVER CAUSE: ICD-10-CM

## 2023-12-29 LAB
ALBUMIN SERPL BCP-MCNC: 4.1 G/DL (ref 3.5–5)
ALP SERPL-CCNC: 68 U/L (ref 34–104)
ALT SERPL W P-5'-P-CCNC: 25 U/L (ref 7–52)
ANION GAP SERPL CALCULATED.3IONS-SCNC: 9 MMOL/L
AST SERPL W P-5'-P-CCNC: 27 U/L (ref 13–39)
BASOPHILS # BLD AUTO: 0.07 THOUSANDS/ÂΜL (ref 0–0.1)
BASOPHILS NFR BLD AUTO: 2 % (ref 0–1)
BILIRUB SERPL-MCNC: 0.49 MG/DL (ref 0.2–1)
BUN SERPL-MCNC: 12 MG/DL (ref 5–25)
CALCIUM SERPL-MCNC: 10 MG/DL (ref 8.4–10.2)
CHLORIDE SERPL-SCNC: 101 MMOL/L (ref 96–108)
CHOLEST SERPL-MCNC: 170 MG/DL
CO2 SERPL-SCNC: 27 MMOL/L (ref 21–32)
CREAT SERPL-MCNC: 1.2 MG/DL (ref 0.6–1.3)
EOSINOPHIL # BLD AUTO: 0.24 THOUSAND/ÂΜL (ref 0–0.61)
EOSINOPHIL NFR BLD AUTO: 5 % (ref 0–6)
ERYTHROCYTE [DISTWIDTH] IN BLOOD BY AUTOMATED COUNT: 12.3 % (ref 11.6–15.1)
GFR SERPL CREATININE-BSD FRML MDRD: 64 ML/MIN/1.73SQ M
GLUCOSE P FAST SERPL-MCNC: 99 MG/DL (ref 65–99)
HCT VFR BLD AUTO: 50.5 % (ref 36.5–49.3)
HDLC SERPL-MCNC: 32 MG/DL
HGB BLD-MCNC: 16.5 G/DL (ref 12–17)
IMM GRANULOCYTES # BLD AUTO: 0.01 THOUSAND/UL (ref 0–0.2)
IMM GRANULOCYTES NFR BLD AUTO: 0 % (ref 0–2)
LDLC SERPL CALC-MCNC: 110 MG/DL (ref 0–100)
LYMPHOCYTES # BLD AUTO: 1.08 THOUSANDS/ÂΜL (ref 0.6–4.47)
LYMPHOCYTES NFR BLD AUTO: 23 % (ref 14–44)
MCH RBC QN AUTO: 29.3 PG (ref 26.8–34.3)
MCHC RBC AUTO-ENTMCNC: 32.7 G/DL (ref 31.4–37.4)
MCV RBC AUTO: 90 FL (ref 82–98)
MONOCYTES # BLD AUTO: 0.57 THOUSAND/ÂΜL (ref 0.17–1.22)
MONOCYTES NFR BLD AUTO: 12 % (ref 4–12)
NEUTROPHILS # BLD AUTO: 2.73 THOUSANDS/ÂΜL (ref 1.85–7.62)
NEUTS SEG NFR BLD AUTO: 58 % (ref 43–75)
NONHDLC SERPL-MCNC: 138 MG/DL
NRBC BLD AUTO-RTO: 0 /100 WBCS
PLATELET # BLD AUTO: 169 THOUSANDS/UL (ref 149–390)
PMV BLD AUTO: 13.8 FL (ref 8.9–12.7)
POTASSIUM SERPL-SCNC: 4.3 MMOL/L (ref 3.5–5.3)
PROT SERPL-MCNC: 8.1 G/DL (ref 6.4–8.4)
PSA SERPL-MCNC: 14.43 NG/ML (ref 0–4)
RBC # BLD AUTO: 5.63 MILLION/UL (ref 3.88–5.62)
SODIUM SERPL-SCNC: 137 MMOL/L (ref 135–147)
TRIGL SERPL-MCNC: 138 MG/DL
WBC # BLD AUTO: 4.7 THOUSAND/UL (ref 4.31–10.16)

## 2023-12-29 PROCEDURE — 80061 LIPID PANEL: CPT

## 2023-12-29 PROCEDURE — 85025 COMPLETE CBC W/AUTO DIFF WBC: CPT

## 2023-12-29 PROCEDURE — 36415 COLL VENOUS BLD VENIPUNCTURE: CPT

## 2023-12-29 PROCEDURE — 80053 COMPREHEN METABOLIC PANEL: CPT

## 2023-12-29 PROCEDURE — G0103 PSA SCREENING: HCPCS

## 2024-01-04 ENCOUNTER — TELEPHONE (OUTPATIENT)
Age: 64
End: 2024-01-04

## 2024-01-04 DIAGNOSIS — R97.20 ELEVATED PSA, BETWEEN 10 AND LESS THAN 20 NG/ML: Primary | ICD-10-CM

## 2024-01-04 NOTE — TELEPHONE ENCOUNTER
Patient being referred to urology due to his PSA being elevated.    Patient had his PSA done on 12/29/23 that resulted at 14.43    As per decision tree, please triage    Patient can be reached at [phone]

## 2024-01-05 ENCOUNTER — OFFICE VISIT (OUTPATIENT)
Dept: WOUND CARE | Facility: CLINIC | Age: 64
End: 2024-01-05
Payer: COMMERCIAL

## 2024-01-05 VITALS
DIASTOLIC BLOOD PRESSURE: 94 MMHG | HEART RATE: 76 BPM | WEIGHT: 198 LBS | TEMPERATURE: 98 F | RESPIRATION RATE: 18 BRPM | SYSTOLIC BLOOD PRESSURE: 164 MMHG | HEIGHT: 68 IN | BODY MASS INDEX: 30.01 KG/M2

## 2024-01-05 DIAGNOSIS — S31.819A OPEN WOUND OF RIGHT BUTTOCK, INITIAL ENCOUNTER: Primary | ICD-10-CM

## 2024-01-05 DIAGNOSIS — Z72.0 TOBACCO ABUSE: ICD-10-CM

## 2024-01-05 DIAGNOSIS — R22.0 NODULE OF SKIN OF HEAD: ICD-10-CM

## 2024-01-05 PROCEDURE — 99213 OFFICE O/P EST LOW 20 MIN: CPT | Performed by: ORTHOPAEDIC SURGERY

## 2024-01-05 PROCEDURE — G0463 HOSPITAL OUTPT CLINIC VISIT: HCPCS | Performed by: ORTHOPAEDIC SURGERY

## 2024-01-05 PROCEDURE — 99204 OFFICE O/P NEW MOD 45 MIN: CPT | Performed by: ORTHOPAEDIC SURGERY

## 2024-01-05 RX ORDER — LIDOCAINE 40 MG/G
CREAM TOPICAL ONCE
Status: COMPLETED | OUTPATIENT
Start: 2024-01-05 | End: 2024-01-05

## 2024-01-05 RX ORDER — SODIUM HYPOCHLORITE 2.5 MG/ML
1 SOLUTION TOPICAL DAILY
Qty: 473 ML | Refills: 0 | Status: SHIPPED | OUTPATIENT
Start: 2024-01-05

## 2024-01-05 RX ADMIN — LIDOCAINE 1 APPLICATION: 40 CREAM TOPICAL at 08:46

## 2024-01-05 NOTE — PATIENT INSTRUCTIONS
"Orders Placed This Encounter   Procedures    Wound cleansing and dressings Other (comment) (Cyst ) Right Buttocks     Buttocks wound:      Wash your hands with soap and water.  Remove old dressing, discard into plastic bag and place in trash. Do not scrub the wound. Pat dry using gauze. (Cleansed with Dakins today at the wound center)    Shower no, sponge bath only      Apply calmoseptine to skin surrounding wound  Gently pack dakins moistened 1/2\" plain packing to the open wound.    Cover with gauze or abd depending on drainage  Secure with tape  Change dressing daily.     The above was completed today at the wound center.     Standing Status:   Future     Standing Expiration Date:   1/5/2025    Wound miscellaneous orders Other (comment) (Cyst ) Right Buttocks     Smoking Cessation  Please follow up with Dermatology and General Surgery per referral for head area.     Standing Status:   Future     Standing Expiration Date:   1/5/2025    Wound off loading Other (comment) (Cyst ) Right Buttocks     Avoid sitting for long periods. Stand frequently.     Standing Status:   Future     Standing Expiration Date:   1/5/2025       "

## 2024-01-05 NOTE — PROGRESS NOTES
Patient ID: Audelia Bella is a 63 y.o. male Date of Birth 1960       Chief Complaint   Patient presents with    New Patient Visit     Right buttocks wound       Allergies:  Patient has no known allergies.    Diagnosis:      Diagnosis ICD-10-CM Associated Orders   1. Open wound of right buttock, initial encounter  S31.819A lidocaine (LMX) 4 % cream     Wound cleansing and dressings Other (comment) (Cyst ) Right Buttocks     Wound miscellaneous orders Other (comment) (Cyst ) Right Buttocks     Wound off loading Other (comment) (Cyst ) Right Buttocks     Ambulatory Referral to General Surgery     Ambulatory Referral to Dermatology     sodium hypochlorite (DAKIN'S HALF-STRENGTH) external solution      2. Nodule of skin of head  R22.0 Ambulatory Referral to General Surgery     Ambulatory Referral to Dermatology      3. Tobacco abuse  Z72.0             Assessment and Plan :  Initial Evaluation right buttock open wound s/p abscess. No signs of infection today. Evaluated nodule on patient's head is not an open wound and should be evaluated by dermatologist.   Debrided as below  Wound management with light packing with Dakins wet to dry dressing. See wound orders below.  No harsh cleansers such as alcohol, peroxide, or antibacterial soap, do not submerge in water  Turn and reposition every 1-2 hours for pressure redistribution on skin. Avoid direct pressure to Wound site. Do Not Sit for Long Periods of Time.   Counseled on smoking cessation.  Referred to Dermatology for nodule on head.  Referred to general surgery for h/o sebaceous cysts.  Followup in 1 week(s) or call sooner with questions or concerns or any signs of infection such as redness, swelling, increased/purulent drainage, fever, chills, increased severe pain.     Subjective:   1/5: Patient is a 63 y.o. male with pmhx HLD, Sinusitis, tobacco abuse (1 ppd) and h/o sebaceous cysts who presents for initial eval of open wound on Right buttock wound which has been  "present since 12/15/23 after an infected abscess. Since then pt was treated with a 10 day course of Bactrim.  Pt has been covering with gauze and band-aid. Pt also states he has hard nodule on his head for many years which he has not addressed. Pt states it started out as a pimple which he expressed and a \"cheesy\" substance came out. He states it hardened over the years.    Does have an odor. No fever. No significant drainage.  No diabetes.  No smoking, ETOH or drug use.  Pt denies any sob, fatigue, N/V, CP, fever or chills.    Pt is accompanied by his wife who is actively involved in his care.          The following portions of the patient's history were reviewed and updated as appropriate:   Patient Active Problem List   Diagnosis    Hyperlipidemia    Sebaceous cyst    Carbuncle and furuncle of buttock    Sinusitis     No past medical history on file.  Past Surgical History:   Procedure Laterality Date    HERNIA REPAIR       Family History   Problem Relation Age of Onset    Cancer Mother     Hypertension Father     Hyperlipidemia Father       Social History     Socioeconomic History    Marital status: /Civil Union     Spouse name: None    Number of children: None    Years of education: None    Highest education level: None   Occupational History    None   Tobacco Use    Smoking status: Every Day     Current packs/day: 1.00     Average packs/day: 1 pack/day for 70.8 years (70.8 ttl pk-yrs)     Types: Cigarettes     Start date: 4/1/1983    Smokeless tobacco: Never   Vaping Use    Vaping status: Never Used   Substance and Sexual Activity    Alcohol use: Never    Drug use: No    Sexual activity: None   Other Topics Concern    None   Social History Narrative    None     Social Determinants of Health     Financial Resource Strain: Not on file   Food Insecurity: Not on file   Transportation Needs: Not on file   Physical Activity: Not on file   Stress: Not on file   Social Connections: Not on file   Intimate " "Partner Violence: Not on file   Housing Stability: Not on file        Current Outpatient Medications:     sodium hypochlorite (DAKIN'S HALF-STRENGTH) external solution, Apply 1 Application topically daily Soak gauze and pack into wounds daily., Disp: 473 mL, Rfl: 0    benzonatate (TESSALON PERLES) 100 mg capsule, Take 1 capsule (100 mg total) by mouth 3 (three) times a day as needed for cough, Disp: 45 capsule, Rfl: 0    multivitamin (THERAGRAN) TABS, Take 1 tablet by mouth daily, Disp: , Rfl:   No current facility-administered medications for this visit.    Review of Systems   Constitutional:  Negative for appetite change, chills, fatigue, fever and unexpected weight change.   HENT:  Negative for congestion, hearing loss and postnasal drip.    Respiratory:  Negative for cough and shortness of breath.    Cardiovascular:  Negative for leg swelling.   Musculoskeletal:  Negative for gait problem.   Skin:  Positive for wound (Right buttock). Negative for rash.   Neurological:  Negative for numbness.   Hematological:  Does not bruise/bleed easily.   Psychiatric/Behavioral: Negative.         Objective:  /94   Pulse 76   Temp 98 °F (36.7 °C)   Resp 18   Ht 5' 8\" (1.727 m)   Wt 89.8 kg (198 lb)   BMI 30.11 kg/m²   Pain Score: 0-No pain     Physical Exam  Vitals reviewed.   Constitutional:       General: He is not in acute distress.     Appearance: Normal appearance. He is well-developed and normal weight.   HENT:      Head: Normocephalic and atraumatic.   Cardiovascular:      Rate and Rhythm: Normal rate.   Pulmonary:      Effort: Pulmonary effort is normal.   Musculoskeletal:         General: No deformity.      Right lower leg: No edema.      Left lower leg: No edema.   Skin:     General: Skin is warm and dry.      Findings: Wound (right buttock) present.             Comments: Adherent yellow slough on wound bed. See wound assessment   Neurological:      General: No focal deficit present.      Mental Status: He " "is alert and oriented to person, place, and time.      Gait: Gait normal.   Psychiatric:         Mood and Affect: Mood and affect normal.         Behavior: Behavior normal. Behavior is cooperative.              Procedures           Wound Instructions:  Orders Placed This Encounter   Procedures    Wound cleansing and dressings Other (comment) (Cyst ) Right Buttocks     Buttocks wound:      Wash your hands with soap and water.  Remove old dressing, discard into plastic bag and place in trash. Do not scrub the wound. Pat dry using gauze. (Cleansed with Dakins today at the wound center)    Shower no, sponge bath only      Apply calmoseptine to skin surrounding wound  Gently pack dakins moistened 1/2\" plain packing to the open wound.    Cover with gauze or abd depending on drainage  Secure with tape  Change dressing daily.     The above was completed today at the wound center.     Standing Status:   Future     Standing Expiration Date:   1/5/2025    Wound miscellaneous orders Other (comment) (Cyst ) Right Buttocks     Smoking Cessation  Please follow up with Dermatology and General Surgery per referral for head area.     Standing Status:   Future     Standing Expiration Date:   1/5/2025    Wound off loading Other (comment) (Cyst ) Right Buttocks     Avoid sitting for long periods. Stand frequently.     Standing Status:   Future     Standing Expiration Date:   1/5/2025    Ambulatory Referral to General Surgery     Standing Status:   Future     Standing Expiration Date:   1/5/2025     Referral Priority:   Routine     Referral Type:   Consult - AMB     Referral Reason:   Specialty Services Required     Requested Specialty:   General Surgery     Number of Visits Requested:   1     Expiration Date:   1/5/2025    Ambulatory Referral to Dermatology     Standing Status:   Future     Standing Expiration Date:   1/5/2025     Referral Priority:   Routine     Referral Type:   Consult - AMB     Referral Reason:   Specialty Services " "Required     Requested Specialty:   Dermatology     Number of Visits Requested:   1     Expiration Date:   1/5/2025       Total time spent today:  45 minutes.  This includes reviewing the patient's chart, pertinent physician records Dr. Looney, Family Medicine, 12/18/23 and 12/28/23.      Lulu Anand PA-C, [facility]    Portions of the record may have been created with voice recognition software. Occasional wrong word or \"sound alike\" substitutions may have occurred due to the inherent limitations of voice recognition software. Read the chart carefully and recognize, using context, where substitutions have occurred.      "

## 2024-01-08 ENCOUNTER — OFFICE VISIT (OUTPATIENT)
Dept: UROLOGY | Facility: CLINIC | Age: 64
End: 2024-01-08
Payer: COMMERCIAL

## 2024-01-08 VITALS
DIASTOLIC BLOOD PRESSURE: 84 MMHG | SYSTOLIC BLOOD PRESSURE: 136 MMHG | OXYGEN SATURATION: 96 % | WEIGHT: 188.8 LBS | HEIGHT: 68 IN | HEART RATE: 79 BPM | BODY MASS INDEX: 28.61 KG/M2

## 2024-01-08 DIAGNOSIS — R97.20 ELEVATED PSA: Primary | ICD-10-CM

## 2024-01-08 PROCEDURE — 99204 OFFICE O/P NEW MOD 45 MIN: CPT | Performed by: PHYSICIAN ASSISTANT

## 2024-01-08 NOTE — PROGRESS NOTES
1/8/2024      Chief Complaint   Patient presents with    New Patient Visit     Elevated PSA         Assessment and Plan    63 y.o. male -- New patient    1. Elevated PSA  - PSA (1/4/24) 14.43  - MARLENY today benign, enlarged prostate  - Repeat PSA and return to review  - Discussed MRI vs prostate biopsy. Denies blood thinners. Does have claustrophobia   - Call with any questions or concerns in the meantime  - All questions answered; patient understands and agrees with plan       History of Present Illness  Audelia Bella is a 63 y.o. male new patient here for evaluation of elevated PSA.     Denies seeing urology in the past. Patient had recent elevated PSA of 14.43. Denies prior elevated PSA. Does have mild LUTS, overall happy with urination at this time. Denies family history of  malignancies.     Review of Systems   Constitutional:  Negative for activity change, appetite change, chills and fever.   HENT:  Negative for congestion and trouble swallowing.    Respiratory:  Negative for cough and shortness of breath.    Cardiovascular:  Negative for chest pain, palpitations and leg swelling.   Gastrointestinal:  Negative for abdominal pain, constipation, diarrhea, nausea and vomiting.   Genitourinary:  Negative for difficulty urinating, dysuria, flank pain, frequency, hematuria and urgency.   Musculoskeletal:  Negative for back pain and gait problem.   Skin:  Negative for wound.   Allergic/Immunologic: Negative for immunocompromised state.   Neurological:  Negative for dizziness and syncope.   Hematological:  Does not bruise/bleed easily.   Psychiatric/Behavioral:  Negative for confusion.    All other systems reviewed and are negative.          AUA SYMPTOM SCORE      Flowsheet Row Most Recent Value   AUA SYMPTOM SCORE    How often have you had a sensation of not emptying your bladder completely after you finished urinating? 0 (P)    How often have you had to urinate again less than two hours after you finished urinating?  "2 (P)    How often have you found you stopped and started again several times when you urinate? 0 (P)    How often have you found it difficult to postpone urination? 1 (P)    How often have you had a weak urinary stream? 2 (P)    How often have you had to push or strain to begin urination? 0 (P)    How many times did you most typically get up to urinate from the time you went to bed at night until the time you got up in the morning? 4 (P)    Quality of Life: If you were to spend the rest of your life with your urinary condition just the way it is now, how would you feel about that? 2 (P)    AUA SYMPTOM SCORE 9 (P)              Vitals  Vitals:    01/08/24 0817   BP: 136/84   Pulse: 79   SpO2: 96%   Weight: 85.6 kg (188 lb 12.8 oz)   Height: 5' 8\" (1.727 m)       Physical Exam  Constitutional:       General: He is not in acute distress.     Appearance: Normal appearance. He is not ill-appearing, toxic-appearing or diaphoretic.   HENT:      Head: Normocephalic.      Nose: No congestion.   Eyes:      General: No scleral icterus.        Right eye: No discharge.         Left eye: No discharge.      Conjunctiva/sclera: Conjunctivae normal.      Pupils: Pupils are equal, round, and reactive to light.   Pulmonary:      Effort: Pulmonary effort is normal.   Genitourinary:     Comments: Prostate smooth, symmetrical, no palpable nodules    Musculoskeletal:      Cervical back: Normal range of motion.   Skin:     General: Skin is warm and dry.      Coloration: Skin is not jaundiced or pale.      Findings: No bruising, erythema, lesion or rash.   Neurological:      General: No focal deficit present.      Mental Status: He is alert and oriented to person, place, and time. Mental status is at baseline.      Gait: Gait normal.   Psychiatric:         Mood and Affect: Mood normal.         Behavior: Behavior normal.         Thought Content: Thought content normal.         Judgment: Judgment normal.           Past History  History " reviewed. No pertinent past medical history.  Social History     Socioeconomic History    Marital status: /Civil Union     Spouse name: None    Number of children: None    Years of education: None    Highest education level: None   Occupational History    None   Tobacco Use    Smoking status: Every Day     Current packs/day: 1.00     Average packs/day: 1 pack/day for 70.8 years (70.8 ttl pk-yrs)     Types: Cigarettes     Start date: 4/1/1983    Smokeless tobacco: Never   Vaping Use    Vaping status: Never Used   Substance and Sexual Activity    Alcohol use: Never    Drug use: No    Sexual activity: Yes   Other Topics Concern    None   Social History Narrative    None     Social Determinants of Health     Financial Resource Strain: Not on file   Food Insecurity: Not on file   Transportation Needs: Not on file   Physical Activity: Not on file   Stress: Not on file   Social Connections: Not on file   Intimate Partner Violence: Not on file   Housing Stability: Not on file     Social History     Tobacco Use   Smoking Status Every Day    Current packs/day: 1.00    Average packs/day: 1 pack/day for 70.8 years (70.8 ttl pk-yrs)    Types: Cigarettes    Start date: 4/1/1983   Smokeless Tobacco Never     Family History   Problem Relation Age of Onset    Cancer Mother     Hypertension Father     Hyperlipidemia Father        The following portions of the patient's history were reviewed and updated as appropriate: allergies, current medications, past medical history, past social history, past surgical history and problem list.    Results  No results found for this or any previous visit (from the past 1 hour(s)).]  Lab Results   Component Value Date    PSA 14.43 (H) 12/29/2023    PSA 3.3 03/03/2018     Lab Results   Component Value Date    CALCIUM 10.0 12/29/2023    K 4.3 12/29/2023    CO2 27 12/29/2023     12/29/2023    BUN 12 12/29/2023    CREATININE 1.20 12/29/2023     Lab Results   Component Value Date    WBC 4.70  12/29/2023    HGB 16.5 12/29/2023    HCT 50.5 (H) 12/29/2023    MCV 90 12/29/2023     12/29/2023       Vita Alexandre PA-C

## 2024-01-12 ENCOUNTER — OFFICE VISIT (OUTPATIENT)
Dept: WOUND CARE | Facility: CLINIC | Age: 64
End: 2024-01-12
Payer: COMMERCIAL

## 2024-01-12 VITALS
TEMPERATURE: 97.1 F | HEART RATE: 81 BPM | RESPIRATION RATE: 18 BRPM | DIASTOLIC BLOOD PRESSURE: 71 MMHG | SYSTOLIC BLOOD PRESSURE: 156 MMHG

## 2024-01-12 DIAGNOSIS — Z72.0 TOBACCO ABUSE: ICD-10-CM

## 2024-01-12 DIAGNOSIS — R22.0 NODULE OF SKIN OF HEAD: ICD-10-CM

## 2024-01-12 DIAGNOSIS — S31.819A OPEN WOUND OF RIGHT BUTTOCK, INITIAL ENCOUNTER: Primary | ICD-10-CM

## 2024-01-12 PROCEDURE — 11045 DBRDMT SUBQ TISS EACH ADDL: CPT | Performed by: ORTHOPAEDIC SURGERY

## 2024-01-12 PROCEDURE — 11042 DBRDMT SUBQ TIS 1ST 20SQCM/<: CPT | Performed by: ORTHOPAEDIC SURGERY

## 2024-01-12 RX ORDER — LIDOCAINE 40 MG/G
CREAM TOPICAL ONCE
Status: COMPLETED | OUTPATIENT
Start: 2024-01-12 | End: 2024-01-12

## 2024-01-12 RX ADMIN — LIDOCAINE: 40 CREAM TOPICAL at 09:04

## 2024-01-12 NOTE — PATIENT INSTRUCTIONS
Orders Placed This Encounter   Procedures    Wound cleansing and dressings Other (comment) (Cyst ) Right Buttocks     Buttocks wound:     Wash your hands with soap and water. Remove old dressing, discard into plastic bag and place in trash. Cleanse the wound with a Dakins moistened gauze for 5 to 10 minutes, then remove.  Do not scrub the wound. Pat dry using gauze.     Shower no, sponge bath only.  Maxorb Ag to the open wound.   Cover with gauze or abd depending on drainage.  Secure with tape.    Change dressing daily.     The above was completed today at the wound center.     Standing Status:   Future     Standing Expiration Date:   1/12/2025    Wound miscellaneous orders     Smoking Cessation Please follow up with Dermatology and General Surgery per referral for head area     Standing Status:   Future     Standing Expiration Date:   1/12/2025    Wound off loading Other (comment) (Cyst ) Right Buttocks     Avoid sitting for long periods. Stand frequently.     Standing Status:   Future     Standing Expiration Date:   1/12/2025

## 2024-01-12 NOTE — PROGRESS NOTES
Patient ID: Audelia Bella is a 63 y.o. male Date of Birth 1960       Chief Complaint   Patient presents with    Follow Up Wound Care Visit     Right Buttocks Wound       Allergies:  Patient has no known allergies.    Diagnosis:   Diagnosis ICD-10-CM Associated Orders   1. Open wound of right buttock, initial encounter  S31.819A lidocaine (LMX) 4 % cream     Wound cleansing and dressings Other (comment) (Cyst ) Right Buttocks     Wound miscellaneous orders     Wound off loading Other (comment) (Cyst ) Right Buttocks     Debridement      2. Tobacco abuse  Z72.0 lidocaine (LMX) 4 % cream     Wound cleansing and dressings Other (comment) (Cyst ) Right Buttocks     Wound miscellaneous orders     Wound off loading Other (comment) (Cyst ) Right Buttocks      3. Nodule of skin of head  R22.0            Assessment and Plan :  Follow-up evaluation right buttock open wound s/p abscess progressively healing Depth decreased to 0.2cm.   Debrided as below  Change wound management to Dakins soaks followed by Melgisorb Ag.  See wound orders below.  No harsh cleansers such as alcohol, peroxide, or antibacterial soap, do not submerge in water.  May shower and change dressing immediately after.  Turn and reposition every 1-2 hours for pressure redistribution on skin. Avoid direct pressure to Wound site. Do Not Sit for Long Periods of Time.   Counseled on the importance of smoking cessation.  F/u with Dermatology for nodule on head.  Follow-up in 2 week(s) or call sooner with questions or concerns or any signs of infection such as redness, swelling, increased/purulent drainage, fever, chills, increased severe pain.     Subjective:   1/5: Patient is a 63 y.o. male with pmhx HLD, Sinusitis, tobacco abuse (1 ppd) and h/o sebaceous cysts who presents for initial eval of open wound on Right buttock wound which has been present since 12/15/23 after an infected abscess. Since then pt was treated with a 10 day course of Bactrim.  Pt has  "been covering with gauze and band-aid. Pt also states he has hard nodule on his head for many years which he has not addressed. Pt states it started out as a pimple which he expressed and a \"cheesy\" substance came out. He states it hardened over the years.    Does have an odor. No fever. No significant drainage.  No diabetes.  No smoking, ETOH or drug use.  Pt denies any sob, fatigue, N/V, CP, fever or chills.    Pt is accompanied by his wife who is actively involved in his care.    1/12: Patient presents for followup evaluation of ight buttock open wound s/p abscess accompanied by his wife.  No new complaints. No increased pain or drainage.  Has been doing Dakins wet to dry daily dressing changes. on the wound and coflex lite for compression.  Patient is using her compression pumps twice daily.  Pt denies any fever or chills.      The following portions of the patient's history were reviewed and updated as appropriate:   Patient Active Problem List   Diagnosis    Hyperlipidemia    Sebaceous cyst    Carbuncle and furuncle of buttock    Sinusitis     No past medical history on file.  Past Surgical History:   Procedure Laterality Date    HERNIA REPAIR       Family History   Problem Relation Age of Onset    Cancer Mother     Hypertension Father     Hyperlipidemia Father      Social History     Socioeconomic History    Marital status: /Civil Union     Spouse name: None    Number of children: None    Years of education: None    Highest education level: None   Occupational History    None   Tobacco Use    Smoking status: Every Day     Current packs/day: 1.00     Average packs/day: 1 pack/day for 70.8 years (70.8 ttl pk-yrs)     Types: Cigarettes     Start date: 4/1/1983    Smokeless tobacco: Never   Vaping Use    Vaping status: Never Used   Substance and Sexual Activity    Alcohol use: Never    Drug use: No    Sexual activity: Yes   Other Topics Concern    None   Social History Narrative    None     Social " Determinants of Health     Financial Resource Strain: Not on file   Food Insecurity: Not on file   Transportation Needs: Not on file   Physical Activity: Not on file   Stress: Not on file   Social Connections: Not on file   Intimate Partner Violence: Not on file   Housing Stability: Not on file       Current Outpatient Medications:     multivitamin (THERAGRAN) TABS, Take 1 tablet by mouth daily, Disp: , Rfl:     sodium hypochlorite (DAKIN'S HALF-STRENGTH) external solution, Apply 1 Application topically daily Soak gauze and pack into wounds daily., Disp: 473 mL, Rfl: 0  No current facility-administered medications for this visit.    Review of Systems   Constitutional:  Negative for appetite change, chills, fatigue, fever and unexpected weight change.   HENT:  Negative for congestion, hearing loss and postnasal drip.    Respiratory:  Negative for cough and shortness of breath.    Cardiovascular:  Negative for leg swelling.   Musculoskeletal:  Negative for gait problem.   Skin:  Positive for wound (Right buttock). Negative for rash.   Neurological:  Negative for numbness.   Hematological:  Does not bruise/bleed easily.   Psychiatric/Behavioral: Negative.           Objective:  /71   Pulse 81   Temp (!) 97.1 °F (36.2 °C)   Resp 18   Pain Score:   3     Physical Exam  Vitals reviewed.   Constitutional:       General: He is not in acute distress.     Appearance: Normal appearance. He is well-developed and normal weight.   HENT:      Head: Normocephalic and atraumatic.   Cardiovascular:      Rate and Rhythm: Normal rate.   Pulmonary:      Effort: Pulmonary effort is normal.   Musculoskeletal:         General: No deformity.      Right lower leg: No edema.      Left lower leg: No edema.   Skin:     General: Skin is warm and dry.      Findings: Wound (right buttock) present.             Comments: Adherent yellow slough on wound bed. Depth decreased from 0.4 cm to 0.2 cm. See wound assessment   Neurological:       "General: No focal deficit present.      Mental Status: He is alert and oriented to person, place, and time.      Gait: Gait normal.   Psychiatric:         Mood and Affect: Mood and affect normal.         Behavior: Behavior normal. Behavior is cooperative.               Wound 01/05/24 Other (comment) Buttocks Right (Active)   Wound Description Pink 01/12/24 0859   Jody-wound Assessment Dry 01/12/24 0859   Wound Length (cm) 0.4 cm 01/12/24 0859   Wound Width (cm) 0.3 cm 01/12/24 0859   Wound Depth (cm) 0.2 cm 01/12/24 0859   Wound Surface Area (cm^2) 0.12 cm^2 01/12/24 0859   Wound Volume (cm^3) 0.024 cm^3 01/12/24 0859   Calculated Wound Volume (cm^3) 0.02 cm^3 01/12/24 0859   Change in Wound Size % 60 01/12/24 0859   Drainage Amount Small 01/12/24 0859   Drainage Description Serosanguineous 01/12/24 0859   Non-staged Wound Description Full thickness 01/12/24 0859   Dressing Status Intact 01/12/24 0859       Wound 01/05/24 Other (comment) Head Upper;Mid (Active)   Wound Image   01/05/24 0910   Wound Description Epithelialization 01/12/24 0859   Jody-wound Assessment Dry 01/12/24 0859   Wound Length (cm) 0 cm 01/12/24 0859   Wound Width (cm) 0 cm 01/12/24 0859   Wound Depth (cm) 0 cm 01/12/24 0859   Wound Surface Area (cm^2) 0 cm^2 01/12/24 0859   Wound Volume (cm^3) 0 cm^3 01/12/24 0859   Calculated Wound Volume (cm^3) 0 cm^3 01/12/24 0859   Drainage Amount None 01/12/24 0859   Drainage Description CALLIE 01/12/24 0859   Non-staged Wound Description Not applicable 01/12/24 0859         Debridement   Wound 01/05/24 Other (comment) Buttocks Right    Universal Protocol:  Consent: Verbal consent obtained. Written consent obtained.  Risks and benefits: risks, benefits and alternatives were discussed  Consent given by: patient  Time out: Immediately prior to procedure a \"time out\" was called to verify the correct patient, procedure, equipment, support staff and site/side marked as required.  Patient understanding: patient " states understanding of the procedure being performed  Patient identity confirmed: verbally with patient    Debridement Details  Performed by: PA  Debridement type: surgical  Level of debridement: subcutaneous tissue  Pain control: lidocaine 4%      Post-debridement measurements  Length (cm): 0.4  Width (cm): 0.3  Depth (cm): 0.3  Percent debrided: 100%  Surface Area (cm^2): 0.12  Area Debrided (cm^2): 0.12  Volume (cm^3): 0.04    Tissue and other material debrided: subcutaneous tissue  Devitalized tissue debrided: fibrin  Instrument(s) utilized: curette  Bleeding: small  Hemostasis obtained with: pressure  Procedural pain (0-10): 0  Post-procedural pain: 0   Response to treatment: procedure was tolerated well         Wound Instructions:  Orders Placed This Encounter   Procedures    Wound cleansing and dressings Other (comment) (Cyst ) Right Buttocks     Buttocks wound:     Wash your hands with soap and water. Remove old dressing, discard into plastic bag and place in trash. Cleanse the wound with a Dakins moistened gauze for 5 to 10 minutes, then remove.  Do not scrub the wound. Pat dry using gauze.     Shower no, sponge bath only.  Maxorb Ag to the open wound.   Cover with gauze or abd depending on drainage.  Secure with tape.    Change dressing daily.     The above was completed today at the wound center.     Standing Status:   Future     Standing Expiration Date:   1/12/2025    Wound miscellaneous orders     Smoking Cessation Please follow up with Dermatology and General Surgery per referral for head area     Standing Status:   Future     Standing Expiration Date:   1/12/2025    Wound off loading Other (comment) (Cyst ) Right Buttocks     Avoid sitting for long periods. Stand frequently.     Standing Status:   Future     Standing Expiration Date:   1/12/2025    Debridement     This order was created via procedure documentation       Lulu Anand PA-C, [facility]      Portions of the record may have been created with  "voice recognition software. Occasional wrong word or \"sound alike\" substitutions may have occurred due to the inherent limitations of voice recognition software. Read the chart carefully and recognize, using context, where substitutions have occurred.    "

## 2024-01-19 ENCOUNTER — APPOINTMENT (OUTPATIENT)
Dept: LAB | Facility: MEDICAL CENTER | Age: 64
End: 2024-01-19
Payer: COMMERCIAL

## 2024-01-19 DIAGNOSIS — R97.20 ELEVATED PSA, BETWEEN 10 AND LESS THAN 20 NG/ML: ICD-10-CM

## 2024-01-19 LAB — PSA SERPL-MCNC: 9.29 NG/ML (ref 0–4)

## 2024-01-19 PROCEDURE — 36415 COLL VENOUS BLD VENIPUNCTURE: CPT

## 2024-01-19 PROCEDURE — 84153 ASSAY OF PSA TOTAL: CPT

## 2024-01-26 ENCOUNTER — OFFICE VISIT (OUTPATIENT)
Dept: WOUND CARE | Facility: CLINIC | Age: 64
End: 2024-01-26
Payer: COMMERCIAL

## 2024-01-26 ENCOUNTER — OFFICE VISIT (OUTPATIENT)
Dept: FAMILY MEDICINE CLINIC | Facility: CLINIC | Age: 64
End: 2024-01-26
Payer: COMMERCIAL

## 2024-01-26 VITALS
OXYGEN SATURATION: 98 % | WEIGHT: 192 LBS | HEIGHT: 68 IN | RESPIRATION RATE: 16 BRPM | TEMPERATURE: 97.1 F | SYSTOLIC BLOOD PRESSURE: 102 MMHG | DIASTOLIC BLOOD PRESSURE: 80 MMHG | BODY MASS INDEX: 29.1 KG/M2 | HEART RATE: 73 BPM

## 2024-01-26 VITALS
DIASTOLIC BLOOD PRESSURE: 86 MMHG | TEMPERATURE: 97.9 F | HEART RATE: 72 BPM | RESPIRATION RATE: 18 BRPM | SYSTOLIC BLOOD PRESSURE: 172 MMHG

## 2024-01-26 DIAGNOSIS — Z00.00 ANNUAL PHYSICAL EXAM: Primary | ICD-10-CM

## 2024-01-26 DIAGNOSIS — S31.819A OPEN WOUND OF RIGHT BUTTOCK, INITIAL ENCOUNTER: Primary | ICD-10-CM

## 2024-01-26 DIAGNOSIS — Z12.11 SCREEN FOR COLON CANCER: ICD-10-CM

## 2024-01-26 PROCEDURE — 99396 PREV VISIT EST AGE 40-64: CPT | Performed by: FAMILY MEDICINE

## 2024-01-26 PROCEDURE — 99212 OFFICE O/P EST SF 10 MIN: CPT | Performed by: ORTHOPAEDIC SURGERY

## 2024-01-26 PROCEDURE — G0463 HOSPITAL OUTPT CLINIC VISIT: HCPCS | Performed by: ORTHOPAEDIC SURGERY

## 2024-01-26 NOTE — PROGRESS NOTES
ADULT ANNUAL PHYSICAL  [facility]    NAME: Audelia Bella  AGE: 63 y.o. SEX: male  : 1960     DATE: 2024     Assessment and Plan:     Problem List Items Addressed This Visit    None  Visit Diagnoses       Annual physical exam    -  Primary    Screen for colon cancer        Relevant Orders    Cologuard            Immunizations and preventive care screenings were discussed with patient today. Appropriate education was printed on patient's after visit summary.    Discussed risks and benefits of prostate cancer screening. We discussed the controversial history of PSA screening for prostate cancer in the [place] as well as the risk of over detection and over treatment of prostate cancer by way of PSA screening.  The patient understands that PSA blood testing is an imperfect way to screen for prostate cancer and that elevated PSA levels in the blood may also be caused by infection, inflammation, prostatic trauma or manipulation, urological procedures, or by benign prostatic enlargement.    The role of the digital rectal examination in prostate cancer screening was also discussed and I discussed with him that there is large interobserver variability in the findings of digital rectal examination.    Counseling:  Alcohol/drug use: discussed moderation in alcohol intake, the recommendations for healthy alcohol use, and avoidance of illicit drug use.  Dental Health: discussed importance of regular tooth brushing, flossing, and dental visits.  Injury prevention: discussed safety/seat belts, safety helmets, smoke detectors, carbon dioxide detectors, and smoking near bedding or upholstery.  Sexual health: discussed sexually transmitted diseases, partner selection, use of condoms, avoidance of unintended pregnancy, and contraceptive alternatives.  Exercise: the importance of regular exercise/physical activity was discussed. Recommend exercise 3-5 times per week  for at least 30 minutes.     Tobacco Cessation Counseling: Tobacco cessation counseling and education was provided. The patient is sincerely urged to quit consumption of tobacco. He is not ready to quit tobacco. The numerous health risks of tobacco consumption were discussed. If he decides to quit, there are a number of helpful adjunctive aids, and he can see me to discuss nicotine replacement therapy, chantix, or bupropion anytime in the future.         Return in about 3 months (around 4/26/2024) for Recheck.     Chief Complaint:     Chief Complaint   Patient presents with    Annual Exam      History of Present Illness:     Adult Annual Physical   Patient here for a comprehensive physical exam. The patient reports no problems.  Since her last evaluation patient has being seen by wound care for treatment of the ulcerative wound on his buttock, this is healing well and has been discharged from wound care.  Patient also has follow-up with urology in regards to elevated PSA, has upcoming appointment to discuss follow-up visits including MRI versus biopsy.    Diet and Physical Activity  Diet/Nutrition: well balanced diet and consuming 3-5 servings of fruits/vegetables daily.   Exercise: no formal exercise.      Depression Screening  PHQ-2/9 Depression Screening    Little interest or pleasure in doing things: 0 - not at all  Feeling down, depressed, or hopeless: 0 - not at all  PHQ-2 Score: 0  PHQ-2 Interpretation: Negative depression screen       General Health  Sleep: sleeps well.   Hearing: normal - bilateral.  Vision: no vision problems.   Dental: no dental visits for >1 year and brushes teeth once daily.        Health  Symptoms include: urinary frequency and weak urinary stream. Wakes up at least once a night to use the bathroom    Advanced Care Planning  Do you have an advanced directive? no  Do you have a durable medical power of ? no     Review of Systems:     Review of Systems   Constitutional:   Negative for appetite change, chills and fever.   HENT:  Negative for congestion, rhinorrhea and sore throat.    Respiratory:  Negative for chest tightness and shortness of breath.    Cardiovascular:  Negative for chest pain.   Gastrointestinal:  Negative for abdominal pain, blood in stool, constipation, diarrhea, nausea and vomiting.   Genitourinary:  Positive for frequency. Negative for dysuria and hematuria.   Musculoskeletal:  Positive for arthralgias (mild right knee pain).   Skin:  Positive for wound (resolving).   Neurological:  Negative for dizziness, light-headedness and headaches.   Psychiatric/Behavioral:  Negative for sleep disturbance.         Past Medical History:     History reviewed. No pertinent past medical history.   Past Surgical History:     Past Surgical History:   Procedure Laterality Date    HERNIA REPAIR        Family History:     Family History   Problem Relation Age of Onset    Cancer Mother     Hypertension Father     Hyperlipidemia Father       Social History:     Social History     Socioeconomic History    Marital status: /Civil Union     Spouse name: None    Number of children: None    Years of education: None    Highest education level: None   Occupational History    None   Tobacco Use    Smoking status: Every Day     Current packs/day: 1.00     Average packs/day: 1 pack/day for 70.8 years (70.8 ttl pk-yrs)     Types: Cigarettes     Start date: 4/1/1983    Smokeless tobacco: Never   Vaping Use    Vaping status: Never Used   Substance and Sexual Activity    Alcohol use: Never    Drug use: No    Sexual activity: Yes   Other Topics Concern    None   Social History Narrative    None     Social Determinants of Health     Financial Resource Strain: Not on file   Food Insecurity: Not on file   Transportation Needs: Not on file   Physical Activity: Not on file   Stress: Not on file   Social Connections: Not on file   Intimate Partner Violence: Not on file   Housing Stability: Not on  "file      Current Medications:     Current Outpatient Medications   Medication Sig Dispense Refill    multivitamin (THERAGRAN) TABS Take 1 tablet by mouth daily      sodium hypochlorite (DAKIN'S HALF-STRENGTH) external solution Apply 1 Application topically daily Soak gauze and pack into wounds daily. (Patient not taking: Reported on 1/26/2024) 473 mL 0     No current facility-administered medications for this visit.      Allergies:     No Known Allergies   Physical Exam:     /80 (BP Location: Right arm, Patient Position: Sitting, Cuff Size: Large)   Pulse 73   Temp (!) 97.1 °F (36.2 °C) (Tympanic)   Resp 16   Ht 5' 7.75\" (1.721 m)   Wt 87.1 kg (192 lb)   SpO2 98%   BMI 29.41 kg/m²     Physical Exam  Vitals reviewed.   Constitutional:       General: He is not in acute distress.     Appearance: Normal appearance. He is obese. He is not ill-appearing, toxic-appearing or diaphoretic.   Cardiovascular:      Rate and Rhythm: Normal rate.      Pulses: Normal pulses.      Heart sounds: Normal heart sounds. No murmur heard.  Pulmonary:      Effort: Pulmonary effort is normal. No respiratory distress.      Breath sounds: Normal breath sounds.   Abdominal:      General: Abdomen is flat.   Musculoskeletal:         General: No swelling or deformity.   Skin:     General: Skin is warm and dry.      Capillary Refill: Capillary refill takes less than 2 seconds.      Coloration: Skin is not jaundiced.      Comments: Cutaneous horn noted on the scalp   Neurological:      General: No focal deficit present.      Mental Status: He is alert and oriented to person, place, and time.   Psychiatric:         Mood and Affect: Mood normal.            Vijay Looney MD  [facility]    "

## 2024-01-26 NOTE — PATIENT INSTRUCTIONS
Orders Placed This Encounter   Procedures    Wound cleansing and dressings Other (comment) (Cyst ) Right Buttocks     Buttock Wound    You are healed but please protect newly healed area (cover with silicone bordered  foam dressing)  You may shower but remove silicone then reapply     Standing Status:   Future     Standing Expiration Date:   1/26/2025

## 2024-01-26 NOTE — PROGRESS NOTES
"Patient ID: Audelia Bella is a 63 y.o. male Date of Birth 1960       Chief Complaint   Patient presents with    Follow Up Wound Care Visit     Open wound right buttock       Allergies:  Patient has no known allergies.    Diagnosis:   Diagnosis ICD-10-CM Associated Orders   1. Open wound of right buttock, initial encounter  S31.819A Wound cleansing and dressings Other (comment) (Cyst ) Right Buttocks           Assessment and Plan :  Follow-up evaluation right buttock open wound is completely epithelialized or healed.   Keep the area covered and protected for about another week   Follow up as needed or call with questions or concerns    Subjective:   1/5: Patient is a 63 y.o. male with pmhx HLD, Sinusitis, tobacco abuse (1 ppd) and h/o sebaceous cysts who presents for initial eval of open wound on Right buttock wound which has been present since 12/15/23 after an infected abscess. Since then pt was treated with a 10 day course of Bactrim.  Pt has been covering with gauze and band-aid. Pt also states he has hard nodule on his head for many years which he has not addressed. Pt states it started out as a pimple which he expressed and a \"cheesy\" substance came out. He states it hardened over the years.    Does have an odor. No fever. No significant drainage.  No diabetes.  No smoking, ETOH or drug use.  Pt denies any sob, fatigue, N/V, CP, fever or chills.    Pt is accompanied by his wife who is actively involved in his care.    1/12: Patient presents for followup evaluation of right buttock open wound s/p abscess accompanied by his wife.  No new complaints. No increased pain or drainage.  Has been doing Dakins wet to dry daily dressing changes. Pt denies any fever or chills.    1/26: Patient presents for followup evaluation of right buttock open wound s/p abscess accompanied by his wife.  Pt states wound is no longer draining. Has been applying silver alginate to wound bed but remains dry. Pt denies any fever or " chills.      The following portions of the patient's history were reviewed and updated as appropriate:   Patient Active Problem List   Diagnosis    Hyperlipidemia    Sebaceous cyst    Carbuncle and furuncle of buttock    Sinusitis     No past medical history on file.  Past Surgical History:   Procedure Laterality Date    HERNIA REPAIR       Family History   Problem Relation Age of Onset    Cancer Mother     Hypertension Father     Hyperlipidemia Father      Social History     Socioeconomic History    Marital status: /Civil Union     Spouse name: None    Number of children: None    Years of education: None    Highest education level: None   Occupational History    None   Tobacco Use    Smoking status: Every Day     Current packs/day: 1.00     Average packs/day: 1 pack/day for 70.8 years (70.8 ttl pk-yrs)     Types: Cigarettes     Start date: 4/1/1983    Smokeless tobacco: Never   Vaping Use    Vaping status: Never Used   Substance and Sexual Activity    Alcohol use: Never    Drug use: No    Sexual activity: Yes   Other Topics Concern    None   Social History Narrative    None     Social Determinants of Health     Financial Resource Strain: Not on file   Food Insecurity: Not on file   Transportation Needs: Not on file   Physical Activity: Not on file   Stress: Not on file   Social Connections: Not on file   Intimate Partner Violence: Not on file   Housing Stability: Not on file       Current Outpatient Medications:     multivitamin (THERAGRAN) TABS, Take 1 tablet by mouth daily, Disp: , Rfl:     sodium hypochlorite (DAKIN'S HALF-STRENGTH) external solution, Apply 1 Application topically daily Soak gauze and pack into wounds daily., Disp: 473 mL, Rfl: 0    Review of Systems   Constitutional:  Negative for appetite change, chills, fatigue, fever and unexpected weight change.   HENT:  Negative for congestion, hearing loss and postnasal drip.    Respiratory:  Negative for cough and shortness of breath.     Cardiovascular:  Negative for leg swelling.   Musculoskeletal:  Negative for gait problem.   Skin:  Positive for wound (Right buttock). Negative for rash.   Neurological:  Negative for numbness.   Hematological:  Does not bruise/bleed easily.   Psychiatric/Behavioral: Negative.           Objective:  BP (!) 172/86   Pulse 72   Temp 97.9 °F (36.6 °C)   Resp 18   Pain Score: 0-No pain     Physical Exam  Vitals reviewed.   Constitutional:       General: He is not in acute distress.     Appearance: Normal appearance. He is well-developed and normal weight.   HENT:      Head: Normocephalic and atraumatic.   Cardiovascular:      Rate and Rhythm: Normal rate.   Pulmonary:      Effort: Pulmonary effort is normal.   Musculoskeletal:         General: No deformity.      Right lower leg: No edema.      Left lower leg: No edema.   Skin:     General: Skin is warm and dry.      Findings: Wound (right buttock) present.             Comments: Completely Epithelialized. See wound assessment   Neurological:      General: No focal deficit present.      Mental Status: He is alert and oriented to person, place, and time.      Gait: Gait normal.   Psychiatric:         Mood and Affect: Mood and affect normal.         Behavior: Behavior normal. Behavior is cooperative.         Wound 01/05/24 Other (comment) Buttocks Right (Active)   Wound Image Images linked 01/26/24 0811   Wound Description Eschar 01/26/24 0811   Wound Length (cm) 0.1 cm 01/26/24 0811   Wound Width (cm) 0.1 cm 01/26/24 0811   Wound Depth (cm) 0 cm 01/26/24 0811   Wound Surface Area (cm^2) 0.01 cm^2 01/26/24 0811   Wound Volume (cm^3) 0 cm^3 01/26/24 0811   Calculated Wound Volume (cm^3) 0 cm^3 01/26/24 0811   Change in Wound Size % 100 01/26/24 0811   Drainage Amount None 01/26/24 0811   Non-staged Wound Description Not applicable 01/26/24 0811   Dressing Status Intact 01/26/24 0811         Wound Instructions:  Orders Placed This Encounter   Procedures    Wound  "cleansing and dressings Other (comment) (Cyst ) Right Buttocks     Buttock Wound    You are healed but please protect newly healed area (cover with silicone bordered  foam dressing)  You may shower but remove silicone then reapply     Standing Status:   Future     Standing Expiration Date:   1/26/2025       Lulu Anand PA-C, [facility]      Portions of the record may have been created with voice recognition software. Occasional wrong word or \"sound alike\" substitutions may have occurred due to the inherent limitations of voice recognition software. Read the chart carefully and recognize, using context, where substitutions have occurred.    "

## 2024-01-31 NOTE — PROGRESS NOTES
"2/1/2024      Chief Complaint   Patient presents with    Follow-up         Assessment and Plan    63 y.o. male     1.  Elevated PSA  -PSA 9.29, previously 14.43  -Prostate exam benign at last visit  - Discussed recommendations to MRI versus prostate biopsy. Will have MRI for prostate biopsy planning and return to review    2. BPH with LUTS  - Conservative measures  - Trial of Flomax   - Call with any questions or concerns in the meantime  - All questions answered; patient understands and agrees with plan       History of Present Illness  Audelia Bella is a 63 y.o. male patient with history of elevated PSA here for follow up.     PSA 14.43, repeat PSA of 9.29.  Does have mild LUTS including nocturia, frequency, hesitancy. Would like to trial new medication. Denies family history of  malignancies.    Review of Systems   Constitutional:  Negative for activity change, appetite change, chills and fever.   HENT:  Negative for congestion and trouble swallowing.    Respiratory:  Negative for cough and shortness of breath.    Cardiovascular:  Negative for chest pain, palpitations and leg swelling.   Gastrointestinal:  Negative for abdominal pain, constipation, diarrhea, nausea and vomiting.   Genitourinary:  Negative for difficulty urinating, dysuria, flank pain, frequency, hematuria and urgency.   Musculoskeletal:  Negative for back pain and gait problem.   Skin:  Negative for wound.   Allergic/Immunologic: Negative for immunocompromised state.   Neurological:  Negative for dizziness and syncope.   Hematological:  Does not bruise/bleed easily.   Psychiatric/Behavioral:  Negative for confusion.    All other systems reviewed and are negative.       Vitals  Vitals:    02/01/24 0710   BP: 130/90   Pulse: 71   Resp: 16   SpO2: 98%   Weight: 86.2 kg (190 lb)   Height: 5' 7\" (1.702 m)       Physical Exam  Constitutional:       General: He is not in acute distress.     Appearance: Normal appearance. He is not ill-appearing, " toxic-appearing or diaphoretic.   HENT:      Head: Normocephalic.      Nose: No congestion.   Eyes:      General: No scleral icterus.        Right eye: No discharge.         Left eye: No discharge.      Conjunctiva/sclera: Conjunctivae normal.      Pupils: Pupils are equal, round, and reactive to light.   Pulmonary:      Effort: Pulmonary effort is normal.   Musculoskeletal:      Cervical back: Normal range of motion.   Skin:     General: Skin is warm and dry.      Coloration: Skin is not jaundiced or pale.      Findings: No bruising, erythema, lesion or rash.   Neurological:      General: No focal deficit present.      Mental Status: He is alert and oriented to person, place, and time. Mental status is at baseline.      Gait: Gait normal.   Psychiatric:         Mood and Affect: Mood normal.         Behavior: Behavior normal.         Thought Content: Thought content normal.         Judgment: Judgment normal.           Past History  History reviewed. No pertinent past medical history.  Social History     Socioeconomic History    Marital status: /Civil Union     Spouse name: None    Number of children: None    Years of education: None    Highest education level: None   Occupational History    None   Tobacco Use    Smoking status: Every Day     Current packs/day: 1.00     Average packs/day: 1 pack/day for 70.8 years (70.8 ttl pk-yrs)     Types: Cigarettes     Start date: 4/1/1983    Smokeless tobacco: Never   Vaping Use    Vaping status: Never Used   Substance and Sexual Activity    Alcohol use: Not Currently    Drug use: No    Sexual activity: Yes   Other Topics Concern    None   Social History Narrative    None     Social Determinants of Health     Financial Resource Strain: Not on file   Food Insecurity: Not on file   Transportation Needs: Not on file   Physical Activity: Not on file   Stress: Not on file   Social Connections: Not on file   Intimate Partner Violence: Not on file   Housing Stability: Not on  file     Social History     Tobacco Use   Smoking Status Every Day    Current packs/day: 1.00    Average packs/day: 1 pack/day for 70.8 years (70.8 ttl pk-yrs)    Types: Cigarettes    Start date: 4/1/1983   Smokeless Tobacco Never     Family History   Problem Relation Age of Onset    Cancer Mother     Hypertension Father     Hyperlipidemia Father        The following portions of the patient's history were reviewed and updated as appropriate: allergies, current medications, past medical history, past social history, past surgical history and problem list.    Results  No results found for this or any previous visit (from the past 1 hour(s)).]  Lab Results   Component Value Date    PSA 9.29 (H) 01/19/2024    PSA 14.43 (H) 12/29/2023    PSA 3.3 03/03/2018     Lab Results   Component Value Date    CALCIUM 10.0 12/29/2023    K 4.3 12/29/2023    CO2 27 12/29/2023     12/29/2023    BUN 12 12/29/2023    CREATININE 1.20 12/29/2023     Lab Results   Component Value Date    WBC 4.70 12/29/2023    HGB 16.5 12/29/2023    HCT 50.5 (H) 12/29/2023    MCV 90 12/29/2023     12/29/2023       Vita Alexandre PA-C

## 2024-02-01 ENCOUNTER — OFFICE VISIT (OUTPATIENT)
Dept: UROLOGY | Facility: CLINIC | Age: 64
End: 2024-02-01
Payer: COMMERCIAL

## 2024-02-01 VITALS
HEIGHT: 67 IN | SYSTOLIC BLOOD PRESSURE: 130 MMHG | BODY MASS INDEX: 29.82 KG/M2 | HEART RATE: 71 BPM | WEIGHT: 190 LBS | DIASTOLIC BLOOD PRESSURE: 90 MMHG | RESPIRATION RATE: 16 BRPM | OXYGEN SATURATION: 98 %

## 2024-02-01 DIAGNOSIS — N13.8 BPH WITH OBSTRUCTION/LOWER URINARY TRACT SYMPTOMS: ICD-10-CM

## 2024-02-01 DIAGNOSIS — R97.20 ELEVATED PSA: Primary | ICD-10-CM

## 2024-02-01 DIAGNOSIS — N40.1 BPH WITH OBSTRUCTION/LOWER URINARY TRACT SYMPTOMS: ICD-10-CM

## 2024-02-01 PROCEDURE — 99213 OFFICE O/P EST LOW 20 MIN: CPT | Performed by: PHYSICIAN ASSISTANT

## 2024-02-01 RX ORDER — TAMSULOSIN HYDROCHLORIDE 0.4 MG/1
0.4 CAPSULE ORAL
Qty: 90 CAPSULE | Refills: 3 | Status: SHIPPED | OUTPATIENT
Start: 2024-02-01

## 2024-02-01 RX ORDER — ALPRAZOLAM 1 MG/1
1 TABLET ORAL
Qty: 1 TABLET | Refills: 0 | Status: SHIPPED | OUTPATIENT
Start: 2024-02-01

## 2024-02-16 ENCOUNTER — APPOINTMENT (OUTPATIENT)
Dept: LAB | Facility: MEDICAL CENTER | Age: 64
End: 2024-02-16
Payer: COMMERCIAL

## 2024-02-16 DIAGNOSIS — R97.20 ELEVATED PSA: ICD-10-CM

## 2024-02-16 PROBLEM — J32.9 SINUSITIS: Status: RESOLVED | Noted: 2023-12-18 | Resolved: 2024-02-16

## 2024-02-16 LAB
ANION GAP SERPL CALCULATED.3IONS-SCNC: 9 MMOL/L
BUN SERPL-MCNC: 14 MG/DL (ref 5–25)
CALCIUM SERPL-MCNC: 9.8 MG/DL (ref 8.4–10.2)
CHLORIDE SERPL-SCNC: 102 MMOL/L (ref 96–108)
CO2 SERPL-SCNC: 30 MMOL/L (ref 21–32)
CREAT SERPL-MCNC: 1.07 MG/DL (ref 0.6–1.3)
GFR SERPL CREATININE-BSD FRML MDRD: 73 ML/MIN/1.73SQ M
GLUCOSE P FAST SERPL-MCNC: 97 MG/DL (ref 65–99)
POTASSIUM SERPL-SCNC: 4.6 MMOL/L (ref 3.5–5.3)
PSA SERPL-MCNC: 9.31 NG/ML (ref 0–4)
SODIUM SERPL-SCNC: 141 MMOL/L (ref 135–147)

## 2024-02-16 PROCEDURE — 36415 COLL VENOUS BLD VENIPUNCTURE: CPT

## 2024-02-16 PROCEDURE — 84153 ASSAY OF PSA TOTAL: CPT

## 2024-02-16 PROCEDURE — 80048 BASIC METABOLIC PNL TOTAL CA: CPT

## 2024-02-23 ENCOUNTER — HOSPITAL ENCOUNTER (OUTPATIENT)
Dept: RADIOLOGY | Age: 64
Discharge: HOME | End: 2024-02-23
Payer: COMMERCIAL

## 2024-02-23 DIAGNOSIS — R97.20 ELEVATED PSA: ICD-10-CM

## 2024-02-23 PROCEDURE — A9585 GADOBUTROL INJECTION: HCPCS | Performed by: PHYSICIAN ASSISTANT

## 2024-02-23 PROCEDURE — G1004 CDSM NDSC: HCPCS

## 2024-02-23 PROCEDURE — 76377 3D RENDER W/INTRP POSTPROCES: CPT

## 2024-02-23 PROCEDURE — 72197 MRI PELVIS W/O & W/DYE: CPT

## 2024-02-23 RX ORDER — GADOBUTROL 604.72 MG/ML
9 INJECTION INTRAVENOUS
Status: COMPLETED | OUTPATIENT
Start: 2024-02-23 | End: 2024-02-23

## 2024-02-23 RX ADMIN — GADOBUTROL 9 ML: 604.72 INJECTION INTRAVENOUS at 14:39

## 2024-03-07 NOTE — PROGRESS NOTES
3/8/2024      Chief Complaint   Patient presents with    Elevated PSA    Follow-up         Assessment and Plan    63 y.o. male     1. Elevated PSA  - MpMRI (2/23/24) PIRADS 2, 77 g prostate  - PSA (2/16/24) 9.31, previously 9.29 and 14.43  - Discussed watching PSA closely vs prostate biopsy. Patient would like to repeat PSA. Will repeat PSA in 4 weeks. If increases, will proceed with prostate biopsy  - Call with any questions or concerns in the meantime  - All questions answered; patient understands and agrees with plan       History of Present Illness  Audelia Bella is a 63 y.o. male patient with history of elevated PSA here for follow up.     Patient had PSA of 14 and 9. MRI prostate negative PIRADS 2. Denies family history of  malignancies. Took flomax 1 time, however, had a headache and discontinued this. Will trial this medication again, however, states urinary symptoms not overall bothersome.            Component  Ref Range & Units 2/16/24  7:38 AM 1/19/24  7:37 AM 12/29/23  8:20 AM 3/3/18  7:55 AM   PSA, Diagnostic  0.00 - 4.00 ng/mL 9.31 High  9.29 High  CM 14.43 High  CM 3.3 R, CM          Review of Systems   Constitutional:  Negative for activity change, appetite change, chills and fever.   HENT:  Negative for congestion and trouble swallowing.    Respiratory:  Negative for cough and shortness of breath.    Cardiovascular:  Negative for chest pain, palpitations and leg swelling.   Gastrointestinal:  Negative for abdominal pain, constipation, diarrhea, nausea and vomiting.   Genitourinary:  Negative for difficulty urinating, dysuria, flank pain, frequency, hematuria and urgency.   Musculoskeletal:  Negative for back pain and gait problem.   Skin:  Negative for wound.   Allergic/Immunologic: Negative for immunocompromised state.   Neurological:  Negative for dizziness and syncope.   Hematological:  Does not bruise/bleed easily.   Psychiatric/Behavioral:  Negative for confusion.    All other systems  "reviewed and are negative.      Vitals  Vitals:    03/08/24 1409   BP: 140/90   Pulse: 82   Resp: 16   Temp: (!) 97.3 °F (36.3 °C)   TempSrc: Temporal   SpO2: 97%   Weight: 87.5 kg (193 lb)   Height: 5' 8\" (1.727 m)       Physical Exam  Constitutional:       General: He is not in acute distress.     Appearance: Normal appearance. He is not ill-appearing, toxic-appearing or diaphoretic.   HENT:      Head: Normocephalic.      Nose: No congestion.   Eyes:      General: No scleral icterus.        Right eye: No discharge.         Left eye: No discharge.      Conjunctiva/sclera: Conjunctivae normal.      Pupils: Pupils are equal, round, and reactive to light.   Pulmonary:      Effort: Pulmonary effort is normal.   Musculoskeletal:      Cervical back: Normal range of motion.   Skin:     General: Skin is warm and dry.      Coloration: Skin is not jaundiced or pale.      Findings: No bruising, erythema, lesion or rash.   Neurological:      General: No focal deficit present.      Mental Status: He is alert and oriented to person, place, and time. Mental status is at baseline.      Gait: Gait normal.   Psychiatric:         Mood and Affect: Mood normal.         Behavior: Behavior normal.         Thought Content: Thought content normal.         Judgment: Judgment normal.           Past History  History reviewed. No pertinent past medical history.  Social History     Socioeconomic History    Marital status: /Civil Union     Spouse name: None    Number of children: None    Years of education: None    Highest education level: None   Occupational History    None   Tobacco Use    Smoking status: Every Day     Current packs/day: 1.00     Average packs/day: 1 pack/day for 70.9 years (70.9 ttl pk-yrs)     Types: Cigarettes     Start date: 4/1/1983    Smokeless tobacco: Never   Vaping Use    Vaping status: Never Used   Substance and Sexual Activity    Alcohol use: Not Currently    Drug use: No    Sexual activity: Yes   Other " Topics Concern    None   Social History Narrative    None     Social Determinants of Health     Financial Resource Strain: Not on file   Food Insecurity: Not on file   Transportation Needs: Not on file   Physical Activity: Not on file   Stress: Not on file   Social Connections: Not on file   Intimate Partner Violence: Not on file   Housing Stability: Not on file     Social History     Tobacco Use   Smoking Status Every Day    Current packs/day: 1.00    Average packs/day: 1 pack/day for 70.9 years (70.9 ttl pk-yrs)    Types: Cigarettes    Start date: 4/1/1983   Smokeless Tobacco Never     Family History   Problem Relation Age of Onset    Cancer Mother     Hypertension Father     Hyperlipidemia Father        The following portions of the patient's history were reviewed and updated as appropriate: allergies, current medications, past medical history, past social history, past surgical history and problem list.    Results  No results found for this or any previous visit (from the past 1 hour(s)).]  Lab Results   Component Value Date    PSA 9.31 (H) 02/16/2024    PSA 9.29 (H) 01/19/2024    PSA 14.43 (H) 12/29/2023    PSA 3.3 03/03/2018     Lab Results   Component Value Date    CALCIUM 9.8 02/16/2024    K 4.6 02/16/2024    CO2 30 02/16/2024     02/16/2024    BUN 14 02/16/2024    CREATININE 1.07 02/16/2024     Lab Results   Component Value Date    WBC 4.70 12/29/2023    HGB 16.5 12/29/2023    HCT 50.5 (H) 12/29/2023    MCV 90 12/29/2023     12/29/2023       Vita Alexandre PA-C

## 2024-03-08 ENCOUNTER — OFFICE VISIT (OUTPATIENT)
Dept: UROLOGY | Facility: CLINIC | Age: 64
End: 2024-03-08
Payer: COMMERCIAL

## 2024-03-08 VITALS
TEMPERATURE: 97.3 F | WEIGHT: 193 LBS | SYSTOLIC BLOOD PRESSURE: 140 MMHG | OXYGEN SATURATION: 97 % | HEIGHT: 68 IN | RESPIRATION RATE: 16 BRPM | HEART RATE: 82 BPM | BODY MASS INDEX: 29.25 KG/M2 | DIASTOLIC BLOOD PRESSURE: 90 MMHG

## 2024-03-08 DIAGNOSIS — R97.20 ELEVATED PSA: Primary | ICD-10-CM

## 2024-03-08 PROCEDURE — 99213 OFFICE O/P EST LOW 20 MIN: CPT | Performed by: PHYSICIAN ASSISTANT

## 2024-03-29 ENCOUNTER — TELEPHONE (OUTPATIENT)
Age: 64
End: 2024-03-29

## 2024-03-29 NOTE — TELEPHONE ENCOUNTER
Called and left another message for patient to return call to office regarding scheduling, if needed.    2nd call.    Closed referral.

## 2024-03-29 NOTE — TELEPHONE ENCOUNTER
Called and left message for patient to return call to office regarding scheduling, if needed with either derm or plastics.    Deferred referral.

## 2024-04-03 ENCOUNTER — APPOINTMENT (OUTPATIENT)
Dept: LAB | Facility: MEDICAL CENTER | Age: 64
End: 2024-04-03
Payer: COMMERCIAL

## 2024-04-03 DIAGNOSIS — R97.20 ELEVATED PSA: ICD-10-CM

## 2024-04-03 LAB — PSA SERPL-MCNC: 8.7 NG/ML (ref 0–4)

## 2024-04-03 PROCEDURE — 36415 COLL VENOUS BLD VENIPUNCTURE: CPT

## 2024-04-03 PROCEDURE — 84153 ASSAY OF PSA TOTAL: CPT

## 2024-04-08 NOTE — PROGRESS NOTES
4/9/2024      No chief complaint on file.        Assessment and Plan    64 y.o. male     1.  Elevated PSA  - MpMRI (2/23/24) PIRADS 2, 77 g prostate  - PSA trend as below  - Discussed PSA remains above 4. Prostate biopsy would be recommended for tissue sample. Patient does not wish to proceed  - Repeat PSA in 3 months for continued monitoring. Patient would like 6 month follow up with PSA instead. We did discuss risks   - Call with any questions or concerns in the meantime  - All questions answered; patient understands and agrees with plan       History of Present Illness  Audelia Bella is a 64 y.o. male patient with history of elevated PSA here for follow up.     Patient had PSA of 14 and 9. MRI prostate negative PIRADS 2. Denies family history of  malignancies. Took flomax 1 time, however, had a headache and discontinued this. States he is overall happy with urination at this time. PSA trend as below.     Component  Ref Range & Units 4/3/24  7:36 AM 2/16/24  7:38 AM 1/19/24  7:37 AM 12/29/23  8:20 AM 3/3/18  7:55 AM   PSA, Diagnostic  0.00 - 4.00 ng/mL 8.70 High  9.31 High  CM 9.29 High  CM 14.43 High  CM 3.3 R, CM       Review of Systems   Constitutional:  Negative for activity change, appetite change, chills and fever.   HENT:  Negative for congestion and trouble swallowing.    Respiratory:  Negative for cough and shortness of breath.    Cardiovascular:  Negative for chest pain, palpitations and leg swelling.   Gastrointestinal:  Negative for abdominal pain, constipation, diarrhea, nausea and vomiting.   Genitourinary:  Negative for difficulty urinating, dysuria, flank pain, frequency, hematuria and urgency.   Musculoskeletal:  Negative for back pain and gait problem.   Skin:  Negative for wound.   Allergic/Immunologic: Negative for immunocompromised state.   Neurological:  Negative for dizziness and syncope.   Hematological:  Does not bruise/bleed easily.   Psychiatric/Behavioral:  Negative for confusion.     All other systems reviewed and are negative.          AUA SYMPTOM SCORE      Flowsheet Row Most Recent Value   AUA SYMPTOM SCORE    How often have you had a sensation of not emptying your bladder completely after you finished urinating? 0 (P)    How often have you had to urinate again less than two hours after you finished urinating? 0 (P)    How often have you found you stopped and started again several times when you urinate? 0 (P)    How often have you found it difficult to postpone urination? 1 (P)    How often have you had a weak urinary stream? 1 (P)    How often have you had to push or strain to begin urination? 0 (P)    How many times did you most typically get up to urinate from the time you went to bed at night until the time you got up in the morning? 1 (P)    Quality of Life: If you were to spend the rest of your life with your urinary condition just the way it is now, how would you feel about that? 3 (P)    AUA SYMPTOM SCORE 3 (P)              Vitals  There were no vitals filed for this visit.    Physical Exam  Constitutional:       General: He is not in acute distress.     Appearance: Normal appearance. He is not ill-appearing, toxic-appearing or diaphoretic.   HENT:      Head: Normocephalic.      Nose: No congestion.   Eyes:      General: No scleral icterus.        Right eye: No discharge.         Left eye: No discharge.      Conjunctiva/sclera: Conjunctivae normal.      Pupils: Pupils are equal, round, and reactive to light.   Pulmonary:      Effort: Pulmonary effort is normal.   Musculoskeletal:      Cervical back: Normal range of motion.   Skin:     General: Skin is warm and dry.      Coloration: Skin is not jaundiced or pale.      Findings: No bruising, erythema, lesion or rash.   Neurological:      General: No focal deficit present.      Mental Status: He is alert and oriented to person, place, and time. Mental status is at baseline.      Gait: Gait normal.   Psychiatric:         Mood and  Affect: Mood normal.         Behavior: Behavior normal.         Thought Content: Thought content normal.         Judgment: Judgment normal.         Past History  No past medical history on file.  Social History     Socioeconomic History    Marital status: /Civil Union     Spouse name: Not on file    Number of children: Not on file    Years of education: Not on file    Highest education level: Not on file   Occupational History    Not on file   Tobacco Use    Smoking status: Every Day     Current packs/day: 1.00     Average packs/day: 1 pack/day for 71.0 years (71.0 ttl pk-yrs)     Types: Cigarettes     Start date: 4/1/1983    Smokeless tobacco: Never   Vaping Use    Vaping status: Never Used   Substance and Sexual Activity    Alcohol use: Not Currently    Drug use: No    Sexual activity: Yes   Other Topics Concern    Not on file   Social History Narrative    Not on file     Social Determinants of Health     Financial Resource Strain: Not on file   Food Insecurity: Not on file   Transportation Needs: Not on file   Physical Activity: Not on file   Stress: Not on file   Social Connections: Not on file   Intimate Partner Violence: Not on file   Housing Stability: Not on file     Social History     Tobacco Use   Smoking Status Every Day    Current packs/day: 1.00    Average packs/day: 1 pack/day for 71.0 years (71.0 ttl pk-yrs)    Types: Cigarettes    Start date: 4/1/1983   Smokeless Tobacco Never     Family History   Problem Relation Age of Onset    Cancer Mother     Hypertension Father     Hyperlipidemia Father        The following portions of the patient's history were reviewed and updated as appropriate: allergies, current medications, past medical history, past social history, past surgical history and problem list.    Results  No results found for this or any previous visit (from the past 1 hour(s)).]  Lab Results   Component Value Date    PSA 8.70 (H) 04/03/2024    PSA 9.31 (H) 02/16/2024    PSA 9.29 (H)  01/19/2024    PSA 14.43 (H) 12/29/2023     Lab Results   Component Value Date    CALCIUM 9.8 02/16/2024    K 4.6 02/16/2024    CO2 30 02/16/2024     02/16/2024    BUN 14 02/16/2024    CREATININE 1.07 02/16/2024     Lab Results   Component Value Date    WBC 4.70 12/29/2023    HGB 16.5 12/29/2023    HCT 50.5 (H) 12/29/2023    MCV 90 12/29/2023     12/29/2023       Vita Alexandre PA-C

## 2024-04-09 ENCOUNTER — OFFICE VISIT (OUTPATIENT)
Dept: UROLOGY | Facility: CLINIC | Age: 64
End: 2024-04-09
Payer: COMMERCIAL

## 2024-04-09 VITALS
OXYGEN SATURATION: 96 % | SYSTOLIC BLOOD PRESSURE: 138 MMHG | HEART RATE: 75 BPM | WEIGHT: 195 LBS | HEIGHT: 68 IN | BODY MASS INDEX: 29.55 KG/M2 | TEMPERATURE: 97.7 F | DIASTOLIC BLOOD PRESSURE: 76 MMHG

## 2024-04-09 DIAGNOSIS — R97.20 ELEVATED PSA: Primary | ICD-10-CM

## 2024-04-09 PROCEDURE — 99213 OFFICE O/P EST LOW 20 MIN: CPT | Performed by: PHYSICIAN ASSISTANT

## 2024-05-01 LAB — COLOGUARD RESULT REPORTABLE: POSITIVE

## 2024-05-02 DIAGNOSIS — R19.5 POSITIVE COLORECTAL CANCER SCREENING USING COLOGUARD TEST: Primary | ICD-10-CM

## 2024-05-24 ENCOUNTER — TELEPHONE (OUTPATIENT)
Age: 64
End: 2024-05-24

## 2024-05-24 ENCOUNTER — PREP FOR PROCEDURE (OUTPATIENT)
Age: 64
End: 2024-05-24

## 2024-05-24 DIAGNOSIS — Z12.11 SCREENING FOR COLON CANCER: Primary | ICD-10-CM

## 2024-05-24 NOTE — TELEPHONE ENCOUNTER
Scheduled date of colonoscopy (as of today): 12/16/24    Physician performing colonoscopy: Dr. Hendrix    Location of colonoscopy: [place]    Bowel prep reviewed with patient:    [email or web address]

## 2024-05-24 NOTE — TELEPHONE ENCOUNTER
05/24/24  Screened by: Flor Gonzáles    Referring Provider Dr. Looney    Pre- Screening:     There is no height or weight on file to calculate BMI.  29.65  Has patient been referred for a routine screening Colonoscopy? yes  Is the patient between 45-75 years old? yes      Previous Colonoscopy no   If yes:    Date:     Facility:     Reason:           Does the patient want to see a Gastroenterologist prior to their procedure OR are they having any GI symptoms? no    Has the patient been hospitalized or had abdominal surgery in the past 6 months? no    Does the patient use supplemental oxygen? no    Does the patient take Coumadin, Lovenox, Plavix, Elliquis, Xarelto, or other blood thinning medication? no    Has the patient had a stroke, cardiac event, or stent placed in the past year? no        If patient is between 45yrs - 49yrs, please advise patient that we will have to confirm benefits & coverage with their insurance company for a routine screening colonoscopy.

## 2024-06-06 ENCOUNTER — TELEPHONE (OUTPATIENT)
Age: 64
End: 2024-06-06

## 2024-06-06 RX ORDER — AMOXICILLIN 500 MG/1
CAPSULE ORAL
COMMUNITY
Start: 2024-05-08

## 2024-06-06 NOTE — PROGRESS NOTES
Assessment/Plan:    Elevated PSA, between 10 and less than 20 ng/ml  Patient underwent a prostate biopsy today. He tolerated this well. We discussed return criteria. This centered on infectious concerns. We also discussed the risk for prolonged bleeding as well as urinary retention.  The patient understands he will see his results before I do through [facility]. I told him I will try to call him after I get the results but this may take 1-2 days. He verbalized understanding.          Subjective:      Patient ID: Audelia Bella is a 64 y.o. male.    HPI    Audelia Bella is a 64 y.o. male patient with history of elevated PSA here for a prostate biopsy     Patient had PSA of 14 in December 2023 and then 9 where his remained stable since January 2024. MRI prostate negative PIRADS 2. Denies family history of  malignancies.  Options were discussed given his elevated PSA with overall unconcerning MRI he elected for biopsy to be more definitive      Took flomax 1 time, however, had a headache and discontinued this. States he is overall happy with urination at this time.     PSA trend as below.      Component  Ref Range & Units 4/3/24  7:36 AM 2/16/24  7:38 AM 1/19/24  7:37 AM 12/29/23  8:20 AM 3/3/18  7:55 AM   PSA, Diagnostic  0.00 - 4.00 ng/mL 8.70 High  9.31 High  CM 9.29 High  CM 14.43 High  CM 3.3 R, CM         Past Surgical History:   Procedure Laterality Date    HERNIA REPAIR          History reviewed. No pertinent past medical history.     AUA SYMPTOM SCORE      Flowsheet Row Most Recent Value   AUA SYMPTOM SCORE    How often have you had a sensation of not emptying your bladder completely after you finished urinating? 1 (P)     How often have you had to urinate again less than two hours after you finished urinating? 0 (P)     How often have you found you stopped and started again several times when you urinate? 0 (P)     How often have you found it difficult to postpone urination? 0 (P)     How often have you had a  "weak urinary stream? 1 (P)     How often have you had to push or strain to begin urination? 0 (P)     How many times did you most typically get up to urinate from the time you went to bed at night until the time you got up in the morning? 2 (P)     Quality of Life: If you were to spend the rest of your life with your urinary condition just the way it is now, how would you feel about that? 5 (P)     AUA SYMPTOM SCORE 4 (P)               Review of Systems   Constitutional:  Negative for chills and fever.   HENT:  Negative for ear pain and sore throat.    Eyes:  Negative for pain and visual disturbance.   Respiratory:  Negative for cough and shortness of breath.    Cardiovascular:  Negative for chest pain and palpitations.   Gastrointestinal:  Negative for abdominal pain and vomiting.   Genitourinary:  Negative for dysuria and hematuria.   Musculoskeletal:  Negative for arthralgias and back pain.   Skin:  Negative for color change and rash.   Neurological:  Negative for seizures and syncope.   All other systems reviewed and are negative.        Objective:      /80 (BP Location: Left arm, Patient Position: Sitting, Cuff Size: Standard)   Pulse 76   Ht 5' 8\" (1.727 m)   Wt 88.5 kg (195 lb)   SpO2 96%   BMI 29.65 kg/m²     Lab Results   Component Value Date    PSA 8.70 (H) 04/03/2024    PSA 9.31 (H) 02/16/2024    PSA 9.29 (H) 01/19/2024    PSA 14.43 (H) 12/29/2023    PSA 3.3 03/03/2018          Physical Exam  Vitals reviewed.   Constitutional:       Appearance: Normal appearance. He is normal weight.   HENT:      Head: Normocephalic and atraumatic.   Eyes:      Pupils: Pupils are equal, round, and reactive to light.   Abdominal:      General: Abdomen is flat.   Neurological:      General: No focal deficit present.      Mental Status: He is alert and oriented to person, place, and time.   Psychiatric:         Mood and Affect: Mood normal.         Thought Content: Thought content normal.             Biopsy " "prostate     Date/Time  6/7/2024 8:00 AM     Performed by  Padilla Lucas MD   Authorized by  Padilla Lucas MD     Universal Protocol   Consent: Written consent obtained.  Consent given by: patient  Time out: Immediately prior to procedure a \"time out\" was called to verify the correct patient, procedure, equipment, support staff and site/side marked as required.  Patient understanding: patient states understanding of the procedure being performed  Patient consent: the patient's understanding of the procedure matches consent given  Procedure consent: procedure consent matches procedure scheduled  Relevant documents: relevant documents present and verified  Patient identity confirmed: verbally with patient      Local anesthesia used: yes      Anesthesia: local infiltration     Anesthesia   Local anesthesia used: yes  Local Anesthetic: lidocaine 2% without epinephrine     Sedation   Patient sedated: no        Specimen: yes    Culture: no   Procedure Details   Procedure Notes: A time-out was performed identifying correct patient and procedure.  An MA served as a chaperone and assistant for the procedure.  The patient is placed in left lateral decubitus position.  A digital rectal exam was performed  An ultrasound probe was introduced into rectum.  A bilateral nerve block was performed at the junction of the seminal vesicle and base of the prostate.  The prostate volume was measured 85g.  A standard 12 core biopsy template was performed obtaining samples from the lateral and medial base, mid, and apex on each side  The patient tolerated the procedure well.  Return criteria were discussed including the risks for infection bleeding and retention.  Patient tolerance: patient tolerated the procedure well with no immediate complications             Orders  Orders Placed This Encounter   Procedures    Biopsy prostate     This order was created via procedure documentation     "

## 2024-06-06 NOTE — TELEPHONE ENCOUNTER
PT wife called and requesting call back for complete instructions on anything the pt needs to have completed for his biopsy that is scheduled for 6/7/24 @ 8am      Pt call back-[phone]

## 2024-06-07 ENCOUNTER — PROCEDURE VISIT (OUTPATIENT)
Dept: UROLOGY | Facility: AMBULATORY SURGERY CENTER | Age: 64
End: 2024-06-07
Payer: COMMERCIAL

## 2024-06-07 VITALS
HEIGHT: 68 IN | HEART RATE: 76 BPM | BODY MASS INDEX: 29.55 KG/M2 | WEIGHT: 195 LBS | SYSTOLIC BLOOD PRESSURE: 138 MMHG | DIASTOLIC BLOOD PRESSURE: 80 MMHG | OXYGEN SATURATION: 96 %

## 2024-06-07 DIAGNOSIS — R97.20 ELEVATED PSA, BETWEEN 10 AND LESS THAN 20 NG/ML: Primary | ICD-10-CM

## 2024-06-07 PROCEDURE — 96372 THER/PROPH/DIAG INJ SC/IM: CPT

## 2024-06-07 PROCEDURE — G0416 PROSTATE BIOPSY, ANY MTHD: HCPCS | Performed by: PATHOLOGY

## 2024-06-07 PROCEDURE — 55700 PR PROSTATE NEEDLE BIOPSY ANY APPROACH: CPT | Performed by: UROLOGY

## 2024-06-07 PROCEDURE — 88344 IMHCHEM/IMCYTCHM EA MLT ANTB: CPT | Performed by: PATHOLOGY

## 2024-06-07 PROCEDURE — 76942 ECHO GUIDE FOR BIOPSY: CPT | Performed by: UROLOGY

## 2024-06-07 RX ORDER — CEFTRIAXONE 1 G/1
1000 INJECTION, POWDER, FOR SOLUTION INTRAMUSCULAR; INTRAVENOUS EVERY 24 HOURS
Status: DISCONTINUED | OUTPATIENT
Start: 2024-06-07 | End: 2024-06-07

## 2024-06-07 RX ORDER — CEFTRIAXONE 1 G/1
1000 INJECTION, POWDER, FOR SOLUTION INTRAMUSCULAR; INTRAVENOUS EVERY 24 HOURS
Status: SHIPPED | OUTPATIENT
Start: 2024-06-08

## 2024-06-07 RX ADMIN — CEFTRIAXONE 1000 MG: 1 INJECTION, POWDER, FOR SOLUTION INTRAMUSCULAR; INTRAVENOUS at 08:07

## 2024-06-07 NOTE — ASSESSMENT & PLAN NOTE
Patient underwent a prostate biopsy today. He tolerated this well. We discussed return criteria. This centered on infectious concerns. We also discussed the risk for prolonged bleeding as well as urinary retention.  The patient understands he will see his results before I do through [facility]. I told him I will try to call him after I get the results but this may take 1-2 days. He verbalized understanding.

## 2024-06-11 PROCEDURE — G0416 PROSTATE BIOPSY, ANY MTHD: HCPCS | Performed by: PATHOLOGY

## 2024-06-11 PROCEDURE — 88344 IMHCHEM/IMCYTCHM EA MLT ANTB: CPT | Performed by: PATHOLOGY

## 2024-07-18 ENCOUNTER — OFFICE VISIT (OUTPATIENT)
Dept: UROLOGY | Facility: CLINIC | Age: 64
End: 2024-07-18
Payer: COMMERCIAL

## 2024-07-18 VITALS
OXYGEN SATURATION: 97 % | WEIGHT: 197 LBS | HEIGHT: 68 IN | SYSTOLIC BLOOD PRESSURE: 134 MMHG | HEART RATE: 72 BPM | DIASTOLIC BLOOD PRESSURE: 72 MMHG | BODY MASS INDEX: 29.86 KG/M2

## 2024-07-18 DIAGNOSIS — R97.20 ELEVATED PSA: Primary | ICD-10-CM

## 2024-07-18 PROCEDURE — 99213 OFFICE O/P EST LOW 20 MIN: CPT | Performed by: UROLOGY

## 2024-07-18 NOTE — PROGRESS NOTES
Assessment/Plan:    Elevated PSA  Patient had a negative prostate biopsy.    While this is reassuring biopsies are not 100% sensitive even when performed under MRI guided fusion technique.  Therefore recommend close follow-up of this PSA with plan to repeat 6 months from prior.  His PSA continues to rise we will need to consider repeat imaging and/or repeat biopsy.  His PSA is lower or even stable would likely recommend continued PSA observation.    Pt voices agreement with this plan.          Subjective:      Patient ID: Audelia Bella is a 64 y.o. male.    HPI  Audelia Bella is a 64 y.o. male patient with history of elevated PSA but with unremarkable MRI and biopsy in 2024     Patient had PSA of 14 in December 2023 and then 9 where his remained stable since January 2024. Feb 2024 MRI prostate negative PIRADS 2. Denies family history of  malignancies.  Options were discussed given his elevated PSA with overall unconcerning MRI he elected for biopsy to be more definitive and this was done in June 2024 and was negative (1 core of HGPIN).      Took flomax 1 time, however, had a headache and discontinued this. States he is overall happy with urination at this time.      PSA trend as below.      Component  Ref Range & Units 4/3/24  7:36 AM 2/16/24  7:38 AM 1/19/24  7:37 AM 12/29/23  8:20 AM 3/3/18  7:55 AM   PSA, Diagnostic  0.00 - 4.00 ng/mL 8.70 High  9.31 High  CM 9.29 High  CM 14.43 High  CM 3.3 R, CM         Past Surgical History:   Procedure Laterality Date    HERNIA REPAIR          History reviewed. No pertinent past medical history.     AUA SYMPTOM SCORE      Flowsheet Row Most Recent Value   AUA SYMPTOM SCORE    How often have you had a sensation of not emptying your bladder completely after you finished urinating? 0 (P)     How often have you had to urinate again less than two hours after you finished urinating? 1 (P)     How often have you found you stopped and started again several times when you urinate? 0  "(P)     How often have you found it difficult to postpone urination? 1 (P)     How often have you had a weak urinary stream? 2 (P)     How often have you had to push or strain to begin urination? 0 (P)     How many times did you most typically get up to urinate from the time you went to bed at night until the time you got up in the morning? 2 (P)     Quality of Life: If you were to spend the rest of your life with your urinary condition just the way it is now, how would you feel about that? 4 (P)     AUA SYMPTOM SCORE 6 (P)               Review of Systems   Constitutional:  Negative for chills and fever.   HENT:  Negative for ear pain and sore throat.    Eyes:  Negative for pain and visual disturbance.   Respiratory:  Negative for cough and shortness of breath.    Cardiovascular:  Negative for chest pain and palpitations.   Gastrointestinal:  Negative for abdominal pain and vomiting.   Genitourinary:  Negative for dysuria and hematuria.   Musculoskeletal:  Negative for arthralgias and back pain.   Skin:  Negative for color change and rash.   Neurological:  Negative for seizures and syncope.   All other systems reviewed and are negative.        Objective:      /72 (BP Location: Left arm, Patient Position: Sitting, Cuff Size: Standard)   Pulse 72   Ht 5' 8\" (1.727 m)   Wt 89.4 kg (197 lb)   SpO2 97%   BMI 29.95 kg/m²     Lab Results   Component Value Date    PSA 8.70 (H) 04/03/2024    PSA 9.31 (H) 02/16/2024    PSA 9.29 (H) 01/19/2024    PSA 14.43 (H) 12/29/2023    PSA 3.3 03/03/2018          Physical Exam  Vitals reviewed.   Constitutional:       Appearance: Normal appearance. He is normal weight.   HENT:      Head: Normocephalic and atraumatic.   Eyes:      Pupils: Pupils are equal, round, and reactive to light.   Abdominal:      General: Abdomen is flat.   Neurological:      General: No focal deficit present.      Mental Status: He is alert and oriented to person, place, and time.   Psychiatric:         " Mood and Affect: Mood normal.         Thought Content: Thought content normal.           Final Diagnosis   A. Prostate, L. Lateral Base, Biopsy:  - Benign prostatic tissue.       B. Prostate, L. Lateral Mid, Biopsy:  - Benign prostatic tissue.       C. Prostate, L. Lateral Gaston, Biopsy:  - Benign prostatic tissue.       D. Prostate, L. Base, Biopsy:  - Benign prostatic tissue.       E. Prostate, L. Mid, Biopsy:  - Benign prostatic tissue.       F. Prostate, L. Gaston, Biopsy:  - Benign prostatic tissue.       G. Prostate, R. Lateral Base, Biopsy:  - Benign prostatic tissue.       H. Prostate, R. Lateral Mid, Biopsy:  - Benign prostatic tissue.       I. Prostate, R. Lateral Gaston, Biopsy:  - Benign prostatic tissue.       J. Prostate, R. Base, Biopsy:  - Focal high-grade prostatic intraepithelial neoplasia (HGPIN).       K. Prostate, R. Mid, Biopsy:  - Benign prostatic tissue.       L. Prostate, R. Gaston, Biopsy:  - Benign prostatic tissue.         Orders  Orders Placed This Encounter   Procedures    PSA Total, Diagnostic     Standing Status:   Future     Standing Expiration Date:   7/18/2025

## 2024-07-18 NOTE — ASSESSMENT & PLAN NOTE
Patient had a negative prostate biopsy.    While this is reassuring biopsies are not 100% sensitive even when performed under MRI guided fusion technique.  Therefore recommend close follow-up of this PSA with plan to repeat 6 months from prior.  His PSA continues to rise we will need to consider repeat imaging and/or repeat biopsy.  His PSA is lower or even stable would likely recommend continued PSA observation.    Pt voices agreement with this plan.

## 2024-09-30 ENCOUNTER — TELEPHONE (OUTPATIENT)
Dept: UROLOGY | Facility: CLINIC | Age: 64
End: 2024-09-30

## 2024-09-30 NOTE — TELEPHONE ENCOUNTER
Left voicemail for the patient reminding them to complete PSA prior to 10/4 appointment with MR. Advised patient to call back with any questions, concerns, or if they need the orders sent to an outside lab.

## 2024-12-13 ENCOUNTER — PREP FOR PROCEDURE (OUTPATIENT)
Age: 64
End: 2024-12-13

## 2025-07-03 ENCOUNTER — TELEPHONE (OUTPATIENT)
Dept: FAMILY MEDICINE CLINIC | Facility: CLINIC | Age: 65
End: 2025-07-03